# Patient Record
Sex: FEMALE | Race: WHITE | NOT HISPANIC OR LATINO | Employment: FULL TIME | URBAN - METROPOLITAN AREA
[De-identification: names, ages, dates, MRNs, and addresses within clinical notes are randomized per-mention and may not be internally consistent; named-entity substitution may affect disease eponyms.]

---

## 2017-01-26 ENCOUNTER — COMMUNICATION - HEALTHEAST (OUTPATIENT)
Dept: FAMILY MEDICINE | Facility: CLINIC | Age: 37
End: 2017-01-26

## 2017-01-26 DIAGNOSIS — Z00.00 HEALTHY ADULT ON ROUTINE PHYSICAL EXAMINATION: ICD-10-CM

## 2017-01-26 DIAGNOSIS — Z80.3 FAMILY HISTORY OF BREAST CANCER: ICD-10-CM

## 2017-03-07 ENCOUNTER — HOSPITAL ENCOUNTER (OUTPATIENT)
Dept: MAMMOGRAPHY | Facility: HOSPITAL | Age: 37
Discharge: HOME | End: 2017-03-07
Attending: FAMILY MEDICINE

## 2017-03-07 ENCOUNTER — HOSPITAL ENCOUNTER (OUTPATIENT)
Dept: MRI IMAGING | Facility: HOSPITAL | Age: 37
Discharge: HOME | End: 2017-03-07
Attending: FAMILY MEDICINE

## 2017-03-07 DIAGNOSIS — Z12.31 VISIT FOR SCREENING MAMMOGRAM: ICD-10-CM

## 2017-03-07 DIAGNOSIS — Z00.00 HEALTHY ADULT ON ROUTINE PHYSICAL EXAMINATION: ICD-10-CM

## 2017-03-07 DIAGNOSIS — Z80.3 FAMILY HISTORY OF BREAST CANCER: ICD-10-CM

## 2017-03-28 ENCOUNTER — OFFICE VISIT - HEALTHEAST (OUTPATIENT)
Dept: ALLERGY | Facility: CLINIC | Age: 37
End: 2017-03-28

## 2017-03-28 DIAGNOSIS — J30.1 SEASONAL ALLERGIC RHINITIS DUE TO POLLEN: ICD-10-CM

## 2017-04-24 ENCOUNTER — OFFICE VISIT - HEALTHEAST (OUTPATIENT)
Dept: FAMILY MEDICINE | Facility: CLINIC | Age: 37
End: 2017-04-24

## 2017-04-24 DIAGNOSIS — L03.90 CELLULITIS: ICD-10-CM

## 2017-08-16 ENCOUNTER — OFFICE VISIT - HEALTHEAST (OUTPATIENT)
Dept: FAMILY MEDICINE | Facility: CLINIC | Age: 37
End: 2017-08-16

## 2017-08-16 ENCOUNTER — HOSPITAL ENCOUNTER (OUTPATIENT)
Dept: ULTRASOUND IMAGING | Facility: HOSPITAL | Age: 37
Discharge: HOME | End: 2017-08-16
Attending: FAMILY MEDICINE

## 2017-08-16 DIAGNOSIS — R10.11 RUQ ABDOMINAL PAIN: ICD-10-CM

## 2017-08-16 ASSESSMENT — MIFFLIN-ST. JEOR: SCORE: 1577.36

## 2017-08-17 ENCOUNTER — HOSPITAL ENCOUNTER (OUTPATIENT)
Dept: CT IMAGING | Facility: HOSPITAL | Age: 37
Discharge: HOME | End: 2017-08-17
Attending: FAMILY MEDICINE

## 2017-08-17 DIAGNOSIS — R10.11 RUQ ABDOMINAL PAIN: ICD-10-CM

## 2017-08-23 ENCOUNTER — COMMUNICATION - HEALTHEAST (OUTPATIENT)
Dept: FAMILY MEDICINE | Facility: CLINIC | Age: 37
End: 2017-08-23

## 2017-09-17 ENCOUNTER — OFFICE VISIT - HEALTHEAST (OUTPATIENT)
Dept: FAMILY MEDICINE | Facility: CLINIC | Age: 37
End: 2017-09-17

## 2017-09-17 DIAGNOSIS — N39.0 URINARY TRACT INFECTION: ICD-10-CM

## 2017-09-17 DIAGNOSIS — N89.8 VAGINAL IRRITATION: ICD-10-CM

## 2017-09-17 DIAGNOSIS — R30.0 DYSURIA: ICD-10-CM

## 2017-10-09 ENCOUNTER — OFFICE VISIT - HEALTHEAST (OUTPATIENT)
Dept: FAMILY MEDICINE | Facility: CLINIC | Age: 37
End: 2017-10-09

## 2017-10-09 ENCOUNTER — RECORDS - HEALTHEAST (OUTPATIENT)
Dept: GENERAL RADIOLOGY | Facility: CLINIC | Age: 37
End: 2017-10-09

## 2017-10-09 DIAGNOSIS — J45.990 EXERCISE-INDUCED ASTHMA: ICD-10-CM

## 2017-10-09 DIAGNOSIS — J45.990 EXERCISE INDUCED BRONCHOSPASM: ICD-10-CM

## 2017-10-09 DIAGNOSIS — Z00.00 HEALTHY ADULT ON ROUTINE PHYSICAL EXAMINATION: ICD-10-CM

## 2017-10-09 DIAGNOSIS — R05.9 COUGH: ICD-10-CM

## 2017-10-09 DIAGNOSIS — Z90.710 S/P HYSTERECTOMY: ICD-10-CM

## 2017-10-09 ASSESSMENT — MIFFLIN-ST. JEOR: SCORE: 1582.35

## 2017-10-13 ENCOUNTER — AMBULATORY - HEALTHEAST (OUTPATIENT)
Dept: FAMILY MEDICINE | Facility: CLINIC | Age: 37
End: 2017-10-13

## 2017-10-13 ENCOUNTER — COMMUNICATION - HEALTHEAST (OUTPATIENT)
Dept: FAMILY MEDICINE | Facility: CLINIC | Age: 37
End: 2017-10-13

## 2017-10-13 DIAGNOSIS — J02.9 SORE THROAT: ICD-10-CM

## 2017-10-24 ENCOUNTER — OFFICE VISIT - HEALTHEAST (OUTPATIENT)
Dept: FAMILY MEDICINE | Facility: CLINIC | Age: 37
End: 2017-10-24

## 2017-10-24 DIAGNOSIS — J32.9 SINUSITIS: ICD-10-CM

## 2017-10-24 DIAGNOSIS — J02.9 SORE THROAT: ICD-10-CM

## 2017-12-07 ENCOUNTER — OFFICE VISIT - HEALTHEAST (OUTPATIENT)
Dept: FAMILY MEDICINE | Facility: CLINIC | Age: 37
End: 2017-12-07

## 2017-12-07 DIAGNOSIS — J00 COMMON COLD: ICD-10-CM

## 2018-01-03 ENCOUNTER — OFFICE VISIT - HEALTHEAST (OUTPATIENT)
Dept: FAMILY MEDICINE | Facility: CLINIC | Age: 38
End: 2018-01-03

## 2018-01-03 DIAGNOSIS — R30.0 DYSURIA: ICD-10-CM

## 2018-01-03 DIAGNOSIS — N39.0 UTI (URINARY TRACT INFECTION): ICD-10-CM

## 2018-01-03 DIAGNOSIS — S16.1XXS CERVICAL STRAIN, SEQUELA: ICD-10-CM

## 2018-01-03 DIAGNOSIS — J45.909 ASTHMA: ICD-10-CM

## 2018-01-03 DIAGNOSIS — G43.909 MIGRAINE: ICD-10-CM

## 2018-01-03 DIAGNOSIS — E66.3 OVERWEIGHT: ICD-10-CM

## 2018-01-03 DIAGNOSIS — M79.7 FIBROMYALGIA: ICD-10-CM

## 2018-01-03 LAB
ALBUMIN UR-MCNC: ABNORMAL MG/DL
APPEARANCE UR: ABNORMAL
BACTERIA #/AREA URNS HPF: ABNORMAL HPF
BILIRUB UR QL STRIP: NEGATIVE
COLOR UR AUTO: YELLOW
GLUCOSE UR STRIP-MCNC: NEGATIVE MG/DL
HGB UR QL STRIP: ABNORMAL
KETONES UR STRIP-MCNC: NEGATIVE MG/DL
LEUKOCYTE ESTERASE UR QL STRIP: ABNORMAL
MUCOUS THREADS #/AREA URNS LPF: ABNORMAL LPF
NITRATE UR QL: NEGATIVE
PH UR STRIP: 7 [PH] (ref 5–8)
RBC #/AREA URNS AUTO: ABNORMAL HPF
SP GR UR STRIP: 1.02 (ref 1–1.03)
SQUAMOUS #/AREA URNS AUTO: ABNORMAL LPF
UROBILINOGEN UR STRIP-ACNC: ABNORMAL
WBC #/AREA URNS AUTO: ABNORMAL HPF

## 2018-01-03 ASSESSMENT — MIFFLIN-ST. JEOR: SCORE: 1577.81

## 2018-01-05 ENCOUNTER — COMMUNICATION - HEALTHEAST (OUTPATIENT)
Dept: FAMILY MEDICINE | Facility: CLINIC | Age: 38
End: 2018-01-05

## 2018-01-05 LAB — BACTERIA SPEC CULT: ABNORMAL

## 2018-04-02 ENCOUNTER — OFFICE VISIT - HEALTHEAST (OUTPATIENT)
Dept: ALLERGY | Facility: CLINIC | Age: 38
End: 2018-04-02

## 2018-04-02 ENCOUNTER — RECORDS - HEALTHEAST (OUTPATIENT)
Dept: ADMINISTRATIVE | Facility: OTHER | Age: 38
End: 2018-04-02

## 2018-04-02 ENCOUNTER — COMMUNICATION - HEALTHEAST (OUTPATIENT)
Dept: ALLERGY | Facility: CLINIC | Age: 38
End: 2018-04-02

## 2018-04-02 DIAGNOSIS — J45.30 MILD PERSISTENT ASTHMA WITHOUT COMPLICATION: ICD-10-CM

## 2018-04-02 DIAGNOSIS — J38.3 VOCAL CORD DYSFUNCTION: ICD-10-CM

## 2018-04-02 DIAGNOSIS — J30.1 CHRONIC SEASONAL ALLERGIC RHINITIS DUE TO POLLEN: ICD-10-CM

## 2018-08-01 ENCOUNTER — RECORDS - HEALTHEAST (OUTPATIENT)
Dept: ADMINISTRATIVE | Facility: OTHER | Age: 38
End: 2018-08-01

## 2018-09-25 ENCOUNTER — OFFICE VISIT - HEALTHEAST (OUTPATIENT)
Dept: FAMILY MEDICINE | Facility: CLINIC | Age: 38
End: 2018-09-25

## 2018-09-25 DIAGNOSIS — M79.7 FIBROMYALGIA: ICD-10-CM

## 2018-09-25 DIAGNOSIS — G43.909 MIGRAINE: ICD-10-CM

## 2018-09-25 DIAGNOSIS — R92.30 DENSE BREAST TISSUE ON MAMMOGRAM: ICD-10-CM

## 2018-09-25 DIAGNOSIS — Z90.710 S/P HYSTERECTOMY: ICD-10-CM

## 2018-09-25 DIAGNOSIS — I73.00 RAYNAUD'S SYNDROME: ICD-10-CM

## 2018-09-25 DIAGNOSIS — Z00.00 ROUTINE GENERAL MEDICAL EXAMINATION AT A HEALTH CARE FACILITY: ICD-10-CM

## 2018-09-25 DIAGNOSIS — Z80.3 FAMILY HISTORY OF MALIGNANT NEOPLASM OF BREAST: ICD-10-CM

## 2018-09-25 LAB
ALBUMIN SERPL-MCNC: 3.9 G/DL (ref 3.5–5)
ALP SERPL-CCNC: 75 U/L (ref 45–120)
ALT SERPL W P-5'-P-CCNC: 28 U/L (ref 0–45)
ANION GAP SERPL CALCULATED.3IONS-SCNC: 9 MMOL/L (ref 5–18)
AST SERPL W P-5'-P-CCNC: 24 U/L (ref 0–40)
BASOPHILS # BLD AUTO: 0 THOU/UL (ref 0–0.2)
BASOPHILS NFR BLD AUTO: 1 % (ref 0–2)
BILIRUB SERPL-MCNC: 0.5 MG/DL (ref 0–1)
BUN SERPL-MCNC: 15 MG/DL (ref 8–22)
CALCIUM SERPL-MCNC: 9.4 MG/DL (ref 8.5–10.5)
CHLORIDE BLD-SCNC: 107 MMOL/L (ref 98–107)
CHOLEST SERPL-MCNC: 202 MG/DL
CO2 SERPL-SCNC: 24 MMOL/L (ref 22–31)
CREAT SERPL-MCNC: 0.67 MG/DL (ref 0.6–1.1)
EOSINOPHIL # BLD AUTO: 0.1 THOU/UL (ref 0–0.4)
EOSINOPHIL NFR BLD AUTO: 2 % (ref 0–6)
ERYTHROCYTE [DISTWIDTH] IN BLOOD BY AUTOMATED COUNT: 10.6 % (ref 11–14.5)
FASTING STATUS PATIENT QL REPORTED: YES
GFR SERPL CREATININE-BSD FRML MDRD: >60 ML/MIN/1.73M2
GLUCOSE BLD-MCNC: 100 MG/DL (ref 70–125)
HCT VFR BLD AUTO: 41.9 % (ref 35–47)
HDLC SERPL-MCNC: 46 MG/DL
HGB BLD-MCNC: 13.9 G/DL (ref 12–16)
LDLC SERPL CALC-MCNC: 135 MG/DL
LYMPHOCYTES # BLD AUTO: 1.8 THOU/UL (ref 0.8–4.4)
LYMPHOCYTES NFR BLD AUTO: 33 % (ref 20–40)
MCH RBC QN AUTO: 31.7 PG (ref 27–34)
MCHC RBC AUTO-ENTMCNC: 33.2 G/DL (ref 32–36)
MCV RBC AUTO: 95 FL (ref 80–100)
MONOCYTES # BLD AUTO: 0.4 THOU/UL (ref 0–0.9)
MONOCYTES NFR BLD AUTO: 7 % (ref 2–10)
NEUTROPHILS # BLD AUTO: 3.2 THOU/UL (ref 2–7.7)
NEUTROPHILS NFR BLD AUTO: 58 % (ref 50–70)
PLATELET # BLD AUTO: 318 THOU/UL (ref 140–440)
PMV BLD AUTO: 7 FL (ref 7–10)
POTASSIUM BLD-SCNC: 4.6 MMOL/L (ref 3.5–5)
PROT SERPL-MCNC: 6.8 G/DL (ref 6–8)
RBC # BLD AUTO: 4.4 MILL/UL (ref 3.8–5.4)
SODIUM SERPL-SCNC: 140 MMOL/L (ref 136–145)
TRIGL SERPL-MCNC: 107 MG/DL
TSH SERPL DL<=0.005 MIU/L-ACNC: 1.06 UIU/ML (ref 0.3–5)
WBC: 5.5 THOU/UL (ref 4–11)

## 2018-09-25 ASSESSMENT — MIFFLIN-ST. JEOR: SCORE: 1582.63

## 2018-09-26 LAB
25(OH)D3 SERPL-MCNC: 29 NG/ML (ref 30–80)
25(OH)D3 SERPL-MCNC: 29 NG/ML (ref 30–80)

## 2018-11-29 ENCOUNTER — HOSPITAL ENCOUNTER (OUTPATIENT)
Dept: MAMMOGRAPHY | Facility: CLINIC | Age: 38
Discharge: HOME | End: 2018-11-29
Attending: FAMILY MEDICINE

## 2018-11-29 DIAGNOSIS — Z80.3 FAMILY HISTORY OF MALIGNANT NEOPLASM OF BREAST: ICD-10-CM

## 2018-11-29 DIAGNOSIS — R92.30 DENSE BREAST TISSUE ON MAMMOGRAM: ICD-10-CM

## 2018-12-19 ENCOUNTER — RECORDS - HEALTHEAST (OUTPATIENT)
Dept: ADMINISTRATIVE | Facility: OTHER | Age: 38
End: 2018-12-19

## 2019-01-09 ENCOUNTER — OFFICE VISIT - HEALTHEAST (OUTPATIENT)
Dept: FAMILY MEDICINE | Facility: CLINIC | Age: 39
End: 2019-01-09

## 2019-01-09 DIAGNOSIS — Z01.818 PREOP EXAMINATION: ICD-10-CM

## 2019-01-09 DIAGNOSIS — M21.611 BUNION, RIGHT: ICD-10-CM

## 2019-01-09 LAB — HGB BLD-MCNC: 13.9 G/DL (ref 12–16)

## 2019-01-09 ASSESSMENT — MIFFLIN-ST. JEOR: SCORE: 1612.12

## 2019-01-17 ENCOUNTER — RECORDS - HEALTHEAST (OUTPATIENT)
Dept: ADMINISTRATIVE | Facility: OTHER | Age: 39
End: 2019-01-17

## 2019-01-31 ENCOUNTER — RECORDS - HEALTHEAST (OUTPATIENT)
Dept: ADMINISTRATIVE | Facility: OTHER | Age: 39
End: 2019-01-31

## 2019-02-25 ENCOUNTER — RECORDS - HEALTHEAST (OUTPATIENT)
Dept: ADMINISTRATIVE | Facility: OTHER | Age: 39
End: 2019-02-25

## 2019-04-04 ENCOUNTER — OFFICE VISIT - HEALTHEAST (OUTPATIENT)
Dept: ALLERGY | Facility: CLINIC | Age: 39
End: 2019-04-04

## 2019-04-04 DIAGNOSIS — J30.1 SEASONAL ALLERGIC RHINITIS DUE TO POLLEN: ICD-10-CM

## 2019-04-04 DIAGNOSIS — J30.1 CHRONIC SEASONAL ALLERGIC RHINITIS DUE TO POLLEN: ICD-10-CM

## 2019-04-04 DIAGNOSIS — J45.30 MILD PERSISTENT ASTHMA WITHOUT COMPLICATION: ICD-10-CM

## 2019-04-08 ENCOUNTER — RECORDS - HEALTHEAST (OUTPATIENT)
Dept: ADMINISTRATIVE | Facility: OTHER | Age: 39
End: 2019-04-08

## 2019-04-09 ENCOUNTER — COMMUNICATION - HEALTHEAST (OUTPATIENT)
Dept: ALLERGY | Facility: CLINIC | Age: 39
End: 2019-04-09

## 2019-05-02 ENCOUNTER — COMMUNICATION - HEALTHEAST (OUTPATIENT)
Dept: ALLERGY | Facility: CLINIC | Age: 39
End: 2019-05-02

## 2019-05-02 DIAGNOSIS — J30.1 CHRONIC SEASONAL ALLERGIC RHINITIS DUE TO POLLEN: ICD-10-CM

## 2019-05-24 ENCOUNTER — AMBULATORY - HEALTHEAST (OUTPATIENT)
Dept: OTHER | Facility: CLINIC | Age: 39
End: 2019-05-24

## 2019-05-28 ENCOUNTER — COMMUNICATION - HEALTHEAST (OUTPATIENT)
Dept: ALLERGY | Facility: CLINIC | Age: 39
End: 2019-05-28

## 2019-05-28 DIAGNOSIS — J30.1 SEASONAL ALLERGIC RHINITIS DUE TO POLLEN: ICD-10-CM

## 2019-06-10 ENCOUNTER — RECORDS - HEALTHEAST (OUTPATIENT)
Dept: ADMINISTRATIVE | Facility: OTHER | Age: 39
End: 2019-06-10

## 2019-06-12 ENCOUNTER — OFFICE VISIT - HEALTHEAST (OUTPATIENT)
Dept: FAMILY MEDICINE | Facility: CLINIC | Age: 39
End: 2019-06-12

## 2019-06-12 DIAGNOSIS — R07.0 THROAT PAIN: ICD-10-CM

## 2019-06-12 LAB — DEPRECATED S PYO AG THROAT QL EIA: NORMAL

## 2019-06-13 LAB — GROUP A STREP BY PCR: NORMAL

## 2019-06-24 ENCOUNTER — RECORDS - HEALTHEAST (OUTPATIENT)
Dept: ADMINISTRATIVE | Facility: OTHER | Age: 39
End: 2019-06-24

## 2019-07-01 ENCOUNTER — OFFICE VISIT - HEALTHEAST (OUTPATIENT)
Dept: FAMILY MEDICINE | Facility: CLINIC | Age: 39
End: 2019-07-01

## 2019-07-01 DIAGNOSIS — Z01.419 ENCOUNTER FOR GYNECOLOGICAL EXAMINATION WITHOUT ABNORMAL FINDING: ICD-10-CM

## 2019-07-01 DIAGNOSIS — N76.0 BACTERIAL VAGINOSIS: ICD-10-CM

## 2019-07-01 DIAGNOSIS — B96.89 BACTERIAL VAGINOSIS: ICD-10-CM

## 2019-07-01 DIAGNOSIS — Z90.710 S/P HYSTERECTOMY: ICD-10-CM

## 2019-07-01 DIAGNOSIS — N93.9 ABNORMAL VAGINAL BLEEDING: ICD-10-CM

## 2019-07-01 LAB
CLUE CELLS: ABNORMAL
TRICHOMONAS, WET PREP: ABNORMAL
YEAST, WET PREP: ABNORMAL

## 2019-07-01 ASSESSMENT — MIFFLIN-ST. JEOR: SCORE: 1627.26

## 2019-07-02 LAB
C TRACH DNA SPEC QL PROBE+SIG AMP: NEGATIVE
HPV SOURCE: ABNORMAL
HUMAN PAPILLOMA VIRUS 16 DNA: NEGATIVE
HUMAN PAPILLOMA VIRUS 18 DNA: NEGATIVE
HUMAN PAPILLOMA VIRUS FINAL DIAGNOSIS: ABNORMAL
HUMAN PAPILLOMA VIRUS OTHER HR: POSITIVE
N GONORRHOEA DNA SPEC QL NAA+PROBE: NEGATIVE
SPECIMEN DESCRIPTION: ABNORMAL

## 2019-07-05 LAB
BKR LAB AP ABNORMAL BLEEDING: YES
BKR LAB AP BIRTH CONTROL/HORMONES: NORMAL
BKR LAB AP CERVICAL APPEARANCE: NORMAL
BKR LAB AP GYN ADEQUACY: NORMAL
BKR LAB AP GYN INTERPRETATION: NORMAL
BKR LAB AP HPV REFLEX: NORMAL
BKR LAB AP LMP: NORMAL
BKR LAB AP PATIENT STATUS: NORMAL
BKR LAB AP PREVIOUS ABNORMAL: NORMAL
BKR LAB AP PREVIOUS NORMAL: 2015
HIGH RISK?: NO
PATH REPORT.COMMENTS IMP SPEC: NORMAL
RESULT FLAG (HE HISTORICAL CONVERSION): NORMAL

## 2019-07-08 ENCOUNTER — RECORDS - HEALTHEAST (OUTPATIENT)
Dept: ADMINISTRATIVE | Facility: OTHER | Age: 39
End: 2019-07-08

## 2019-10-14 ENCOUNTER — RECORDS - HEALTHEAST (OUTPATIENT)
Dept: ADMINISTRATIVE | Facility: OTHER | Age: 39
End: 2019-10-14

## 2019-10-16 ENCOUNTER — RECORDS - HEALTHEAST (OUTPATIENT)
Dept: ADMINISTRATIVE | Facility: OTHER | Age: 39
End: 2019-10-16

## 2019-11-05 ENCOUNTER — RECORDS - HEALTHEAST (OUTPATIENT)
Dept: ADMINISTRATIVE | Facility: OTHER | Age: 39
End: 2019-11-05

## 2019-11-08 ENCOUNTER — OFFICE VISIT - HEALTHEAST (OUTPATIENT)
Dept: FAMILY MEDICINE | Facility: CLINIC | Age: 39
End: 2019-11-08

## 2019-11-08 DIAGNOSIS — E55.9 VITAMIN D DEFICIENCY: ICD-10-CM

## 2019-11-08 DIAGNOSIS — Z23 NEED FOR INFLUENZA VACCINATION: ICD-10-CM

## 2019-11-08 DIAGNOSIS — Z00.00 ROUTINE GENERAL MEDICAL EXAMINATION AT A HEALTH CARE FACILITY: ICD-10-CM

## 2019-11-08 DIAGNOSIS — J45.20 MILD INTERMITTENT ASTHMA WITHOUT COMPLICATION: ICD-10-CM

## 2019-11-08 LAB
ALBUMIN SERPL-MCNC: 4 G/DL (ref 3.5–5)
ALP SERPL-CCNC: 77 U/L (ref 45–120)
ALT SERPL W P-5'-P-CCNC: 17 U/L (ref 0–45)
ANION GAP SERPL CALCULATED.3IONS-SCNC: 8 MMOL/L (ref 5–18)
AST SERPL W P-5'-P-CCNC: 18 U/L (ref 0–40)
BASOPHILS # BLD AUTO: 0 THOU/UL (ref 0–0.2)
BASOPHILS NFR BLD AUTO: 0 % (ref 0–2)
BILIRUB SERPL-MCNC: 0.6 MG/DL (ref 0–1)
BUN SERPL-MCNC: 13 MG/DL (ref 8–22)
CALCIUM SERPL-MCNC: 9.1 MG/DL (ref 8.5–10.5)
CHLORIDE BLD-SCNC: 104 MMOL/L (ref 98–107)
CHOLEST SERPL-MCNC: 203 MG/DL
CO2 SERPL-SCNC: 26 MMOL/L (ref 22–31)
CREAT SERPL-MCNC: 0.7 MG/DL (ref 0.6–1.1)
EOSINOPHIL # BLD AUTO: 0.1 THOU/UL (ref 0–0.4)
EOSINOPHIL NFR BLD AUTO: 1 % (ref 0–6)
ERYTHROCYTE [DISTWIDTH] IN BLOOD BY AUTOMATED COUNT: 10.5 % (ref 11–14.5)
FASTING STATUS PATIENT QL REPORTED: YES
GFR SERPL CREATININE-BSD FRML MDRD: >60 ML/MIN/1.73M2
GLUCOSE BLD-MCNC: 98 MG/DL (ref 70–125)
HCT VFR BLD AUTO: 40 % (ref 35–47)
HDLC SERPL-MCNC: 45 MG/DL
HGB BLD-MCNC: 14 G/DL (ref 12–16)
LDLC SERPL CALC-MCNC: 142 MG/DL
LYMPHOCYTES # BLD AUTO: 2 THOU/UL (ref 0.8–4.4)
LYMPHOCYTES NFR BLD AUTO: 28 % (ref 20–40)
MCH RBC QN AUTO: 33 PG (ref 27–34)
MCHC RBC AUTO-ENTMCNC: 35 G/DL (ref 32–36)
MCV RBC AUTO: 94 FL (ref 80–100)
MONOCYTES # BLD AUTO: 0.5 THOU/UL (ref 0–0.9)
MONOCYTES NFR BLD AUTO: 7 % (ref 2–10)
NEUTROPHILS # BLD AUTO: 4.6 THOU/UL (ref 2–7.7)
NEUTROPHILS NFR BLD AUTO: 64 % (ref 50–70)
PLATELET # BLD AUTO: 352 THOU/UL (ref 140–440)
PMV BLD AUTO: 7.4 FL (ref 7–10)
POTASSIUM BLD-SCNC: 4.3 MMOL/L (ref 3.5–5)
PROT SERPL-MCNC: 7 G/DL (ref 6–8)
RBC # BLD AUTO: 4.24 MILL/UL (ref 3.8–5.4)
SODIUM SERPL-SCNC: 138 MMOL/L (ref 136–145)
TRIGL SERPL-MCNC: 78 MG/DL
TSH SERPL DL<=0.005 MIU/L-ACNC: 0.91 UIU/ML (ref 0.3–5)
WBC: 7.2 THOU/UL (ref 4–11)

## 2019-11-08 ASSESSMENT — MIFFLIN-ST. JEOR: SCORE: 1587.45

## 2019-11-11 ENCOUNTER — COMMUNICATION - HEALTHEAST (OUTPATIENT)
Dept: FAMILY MEDICINE | Facility: CLINIC | Age: 39
End: 2019-11-11

## 2019-11-11 ENCOUNTER — AMBULATORY - HEALTHEAST (OUTPATIENT)
Dept: FAMILY MEDICINE | Facility: CLINIC | Age: 39
End: 2019-11-11

## 2019-11-11 DIAGNOSIS — M79.7 FIBROMYALGIA: ICD-10-CM

## 2019-11-11 DIAGNOSIS — G43.909 MIGRAINE: ICD-10-CM

## 2019-11-11 LAB
25(OH)D3 SERPL-MCNC: 36.2 NG/ML (ref 30–80)
25(OH)D3 SERPL-MCNC: 36.2 NG/ML (ref 30–80)

## 2019-12-09 ENCOUNTER — OFFICE VISIT - HEALTHEAST (OUTPATIENT)
Dept: FAMILY MEDICINE | Facility: CLINIC | Age: 39
End: 2019-12-09

## 2019-12-09 ENCOUNTER — HOSPITAL ENCOUNTER (OUTPATIENT)
Dept: MAMMOGRAPHY | Facility: CLINIC | Age: 39
Discharge: HOME | End: 2019-12-09
Attending: FAMILY MEDICINE

## 2019-12-09 DIAGNOSIS — Z01.818 PREOP EXAMINATION: ICD-10-CM

## 2019-12-09 DIAGNOSIS — Z98.890 S/P BUNIONECTOMY: ICD-10-CM

## 2019-12-09 DIAGNOSIS — Z12.31 VISIT FOR SCREENING MAMMOGRAM: ICD-10-CM

## 2019-12-09 LAB — HGB BLD-MCNC: 13.2 G/DL (ref 12–16)

## 2019-12-09 ASSESSMENT — MIFFLIN-ST. JEOR: SCORE: 1623.74

## 2019-12-10 ENCOUNTER — RECORDS - HEALTHEAST (OUTPATIENT)
Dept: ADMINISTRATIVE | Facility: OTHER | Age: 39
End: 2019-12-10

## 2019-12-13 ENCOUNTER — RECORDS - HEALTHEAST (OUTPATIENT)
Dept: ADMINISTRATIVE | Facility: OTHER | Age: 39
End: 2019-12-13

## 2019-12-19 ENCOUNTER — RECORDS - HEALTHEAST (OUTPATIENT)
Dept: ADMINISTRATIVE | Facility: OTHER | Age: 39
End: 2019-12-19

## 2020-01-02 ENCOUNTER — RECORDS - HEALTHEAST (OUTPATIENT)
Dept: ADMINISTRATIVE | Facility: OTHER | Age: 40
End: 2020-01-02

## 2020-01-23 ENCOUNTER — RECORDS - HEALTHEAST (OUTPATIENT)
Dept: ADMINISTRATIVE | Facility: OTHER | Age: 40
End: 2020-01-23

## 2020-02-05 ENCOUNTER — RECORDS - HEALTHEAST (OUTPATIENT)
Dept: ADMINISTRATIVE | Facility: OTHER | Age: 40
End: 2020-02-05

## 2020-02-19 ENCOUNTER — RECORDS - HEALTHEAST (OUTPATIENT)
Dept: ADMINISTRATIVE | Facility: OTHER | Age: 40
End: 2020-02-19

## 2020-03-06 ENCOUNTER — OFFICE VISIT - HEALTHEAST (OUTPATIENT)
Dept: FAMILY MEDICINE | Facility: CLINIC | Age: 40
End: 2020-03-06

## 2020-03-06 DIAGNOSIS — N95.1 MENOPAUSAL FLUSHING: ICD-10-CM

## 2020-03-06 DIAGNOSIS — R23.2 FACIAL FLUSHING: ICD-10-CM

## 2020-03-06 LAB
ALBUMIN SERPL-MCNC: 4.1 G/DL (ref 3.5–5)
ALBUMIN UR-MCNC: NEGATIVE MG/DL
ALP SERPL-CCNC: 87 U/L (ref 45–120)
ALT SERPL W P-5'-P-CCNC: 24 U/L (ref 0–45)
APPEARANCE UR: CLEAR
AST SERPL W P-5'-P-CCNC: 20 U/L (ref 0–40)
BACTERIA #/AREA URNS HPF: ABNORMAL HPF
BASOPHILS # BLD AUTO: 0.1 THOU/UL (ref 0–0.2)
BASOPHILS NFR BLD AUTO: 1 % (ref 0–2)
BILIRUB DIRECT SERPL-MCNC: <0.1 MG/DL
BILIRUB SERPL-MCNC: 0.3 MG/DL (ref 0–1)
BILIRUB UR QL STRIP: NEGATIVE
C REACTIVE PROTEIN LHE: 0.4 MG/DL (ref 0–0.8)
COLOR UR AUTO: YELLOW
EOSINOPHIL # BLD AUTO: 0.1 THOU/UL (ref 0–0.4)
EOSINOPHIL NFR BLD AUTO: 2 % (ref 0–6)
ERYTHROCYTE [DISTWIDTH] IN BLOOD BY AUTOMATED COUNT: 11.1 % (ref 11–14.5)
ERYTHROCYTE [SEDIMENTATION RATE] IN BLOOD BY WESTERGREN METHOD: 14 MM/HR (ref 0–20)
FSH SERPL-ACNC: 3.1 MIU/ML
GLUCOSE UR STRIP-MCNC: NEGATIVE MG/DL
HCT VFR BLD AUTO: 40 % (ref 35–47)
HGB BLD-MCNC: 13.4 G/DL (ref 12–16)
HGB UR QL STRIP: ABNORMAL
KETONES UR STRIP-MCNC: NEGATIVE MG/DL
LEUKOCYTE ESTERASE UR QL STRIP: NEGATIVE
LYMPHOCYTES # BLD AUTO: 2.8 THOU/UL (ref 0.8–4.4)
LYMPHOCYTES NFR BLD AUTO: 35 % (ref 20–40)
MCH RBC QN AUTO: 31.7 PG (ref 27–34)
MCHC RBC AUTO-ENTMCNC: 33.6 G/DL (ref 32–36)
MCV RBC AUTO: 95 FL (ref 80–100)
MONOCYTES # BLD AUTO: 0.6 THOU/UL (ref 0–0.9)
MONOCYTES NFR BLD AUTO: 7 % (ref 2–10)
NEUTROPHILS # BLD AUTO: 4.3 THOU/UL (ref 2–7.7)
NEUTROPHILS NFR BLD AUTO: 55 % (ref 50–70)
NITRATE UR QL: NEGATIVE
PH UR STRIP: 5.5 [PH] (ref 5–8)
PLATELET # BLD AUTO: 369 THOU/UL (ref 140–440)
PMV BLD AUTO: 6.9 FL (ref 7–10)
PROT SERPL-MCNC: 7.1 G/DL (ref 6–8)
RBC # BLD AUTO: 4.23 MILL/UL (ref 3.8–5.4)
RBC #/AREA URNS AUTO: ABNORMAL HPF
RHEUMATOID FACT SERPL-ACNC: <15 IU/ML (ref 0–30)
SP GR UR STRIP: 1.01 (ref 1–1.03)
SQUAMOUS #/AREA URNS AUTO: ABNORMAL LPF
UROBILINOGEN UR STRIP-ACNC: ABNORMAL
WBC #/AREA URNS AUTO: ABNORMAL HPF
WBC: 7.8 THOU/UL (ref 4–11)

## 2020-03-09 LAB — ANA SER QL: 3.7 U

## 2020-03-10 LAB — CCP AB SER IA-ACNC: <0.5 U/ML

## 2020-03-12 ENCOUNTER — COMMUNICATION - HEALTHEAST (OUTPATIENT)
Dept: FAMILY MEDICINE | Facility: CLINIC | Age: 40
End: 2020-03-12

## 2020-03-13 ENCOUNTER — AMBULATORY - HEALTHEAST (OUTPATIENT)
Dept: FAMILY MEDICINE | Facility: CLINIC | Age: 40
End: 2020-03-13

## 2020-03-13 DIAGNOSIS — M79.10 MYALGIA: ICD-10-CM

## 2020-03-13 DIAGNOSIS — R76.8 ELEVATED ANTINUCLEAR ANTIBODY (ANA) LEVEL: ICD-10-CM

## 2020-04-10 ENCOUNTER — COMMUNICATION - HEALTHEAST (OUTPATIENT)
Dept: ALLERGY | Facility: CLINIC | Age: 40
End: 2020-04-10

## 2020-04-10 DIAGNOSIS — J30.1 CHRONIC SEASONAL ALLERGIC RHINITIS DUE TO POLLEN: ICD-10-CM

## 2020-04-13 ENCOUNTER — OFFICE VISIT - HEALTHEAST (OUTPATIENT)
Dept: ALLERGY | Facility: CLINIC | Age: 40
End: 2020-04-13

## 2020-04-13 DIAGNOSIS — J30.1 SEASONAL ALLERGIC RHINITIS DUE TO POLLEN: ICD-10-CM

## 2020-04-13 DIAGNOSIS — J30.1 CHRONIC SEASONAL ALLERGIC RHINITIS DUE TO POLLEN: ICD-10-CM

## 2020-04-13 DIAGNOSIS — J45.30 MILD PERSISTENT ASTHMA WITHOUT COMPLICATION: ICD-10-CM

## 2020-04-13 ASSESSMENT — MIFFLIN-ST. JEOR: SCORE: 1615.23

## 2020-05-29 ENCOUNTER — RECORDS - HEALTHEAST (OUTPATIENT)
Dept: ADMINISTRATIVE | Facility: OTHER | Age: 40
End: 2020-05-29

## 2020-05-29 ENCOUNTER — COMMUNICATION - HEALTHEAST (OUTPATIENT)
Dept: FAMILY MEDICINE | Facility: CLINIC | Age: 40
End: 2020-05-29

## 2020-06-16 ENCOUNTER — COMMUNICATION - HEALTHEAST (OUTPATIENT)
Dept: ALLERGY | Facility: CLINIC | Age: 40
End: 2020-06-16

## 2020-06-16 DIAGNOSIS — J45.30 MILD PERSISTENT ASTHMA WITHOUT COMPLICATION: ICD-10-CM

## 2020-06-25 ENCOUNTER — OFFICE VISIT - HEALTHEAST (OUTPATIENT)
Dept: FAMILY MEDICINE | Facility: CLINIC | Age: 40
End: 2020-06-25

## 2020-06-25 DIAGNOSIS — B97.7 HIGH RISK HPV INFECTION: ICD-10-CM

## 2020-06-26 LAB
HPV SOURCE: NORMAL
HUMAN PAPILLOMA VIRUS 16 DNA: NEGATIVE
HUMAN PAPILLOMA VIRUS 18 DNA: NEGATIVE
HUMAN PAPILLOMA VIRUS FINAL DIAGNOSIS: NORMAL
HUMAN PAPILLOMA VIRUS OTHER HR: NEGATIVE
SPECIMEN DESCRIPTION: NORMAL

## 2020-07-02 LAB
BKR LAB AP ABNORMAL BLEEDING: NO
BKR LAB AP BIRTH CONTROL/HORMONES: NORMAL
BKR LAB AP CERVICAL APPEARANCE: NORMAL
BKR LAB AP GYN ADEQUACY: NORMAL
BKR LAB AP GYN INTERPRETATION: NORMAL
BKR LAB AP HPV REFLEX: NORMAL
BKR LAB AP LMP: NORMAL
BKR LAB AP PATIENT STATUS: NORMAL
BKR LAB AP PREVIOUS ABNORMAL: NORMAL
BKR LAB AP PREVIOUS NORMAL: 2015
HIGH RISK?: NO
PATH REPORT.COMMENTS IMP SPEC: NORMAL
RESULT FLAG (HE HISTORICAL CONVERSION): NORMAL

## 2020-12-10 ENCOUNTER — HOSPITAL ENCOUNTER (OUTPATIENT)
Dept: MAMMOGRAPHY | Facility: CLINIC | Age: 40
Discharge: HOME | End: 2020-12-10
Attending: FAMILY MEDICINE

## 2020-12-10 DIAGNOSIS — Z12.31 VISIT FOR SCREENING MAMMOGRAM: ICD-10-CM

## 2021-01-21 ENCOUNTER — RECORDS - HEALTHEAST (OUTPATIENT)
Dept: ADMINISTRATIVE | Facility: OTHER | Age: 41
End: 2021-01-21

## 2021-02-22 ENCOUNTER — AMBULATORY - HEALTHEAST (OUTPATIENT)
Dept: FAMILY MEDICINE | Facility: CLINIC | Age: 41
End: 2021-02-22

## 2021-02-22 ENCOUNTER — OFFICE VISIT - HEALTHEAST (OUTPATIENT)
Dept: FAMILY MEDICINE | Facility: CLINIC | Age: 41
End: 2021-02-22

## 2021-02-22 DIAGNOSIS — J02.9 SORE THROAT: ICD-10-CM

## 2021-02-22 DIAGNOSIS — Z20.822 SUSPECTED COVID-19 VIRUS INFECTION: ICD-10-CM

## 2021-02-22 DIAGNOSIS — R21 RASH OF FACE: ICD-10-CM

## 2021-02-22 LAB
DEPRECATED S PYO AG THROAT QL EIA: NORMAL
GROUP A STREP BY PCR: NORMAL
SARS-COV-2 PCR COMMENT: NORMAL
SARS-COV-2 RNA SPEC QL NAA+PROBE: NEGATIVE
SARS-COV-2 VIRUS SPECIMEN SOURCE: NORMAL

## 2021-02-23 ENCOUNTER — COMMUNICATION - HEALTHEAST (OUTPATIENT)
Dept: SCHEDULING | Facility: CLINIC | Age: 41
End: 2021-02-23

## 2021-03-16 ENCOUNTER — RECORDS - HEALTHEAST (OUTPATIENT)
Dept: ADMINISTRATIVE | Facility: OTHER | Age: 41
End: 2021-03-16

## 2021-04-02 ENCOUNTER — OFFICE VISIT - HEALTHEAST (OUTPATIENT)
Dept: ALLERGY | Facility: CLINIC | Age: 41
End: 2021-04-02

## 2021-04-02 DIAGNOSIS — J30.1 CHRONIC SEASONAL ALLERGIC RHINITIS DUE TO POLLEN: ICD-10-CM

## 2021-04-02 DIAGNOSIS — J30.1 SEASONAL ALLERGIC RHINITIS DUE TO POLLEN: ICD-10-CM

## 2021-04-02 DIAGNOSIS — J45.20 MILD INTERMITTENT ASTHMA WITHOUT COMPLICATION: ICD-10-CM

## 2021-04-02 DIAGNOSIS — J45.30 MILD PERSISTENT ASTHMA WITHOUT COMPLICATION: ICD-10-CM

## 2021-04-02 RX ORDER — CETIRIZINE HYDROCHLORIDE 10 MG/1
10 TABLET ORAL DAILY
Qty: 90 TABLET | Refills: 3 | Status: SHIPPED | OUTPATIENT
Start: 2021-04-02 | End: 2022-05-05

## 2021-04-02 RX ORDER — LORATADINE 10 MG/1
10 TABLET ORAL DAILY
Qty: 90 TABLET | Refills: 3 | Status: SHIPPED | OUTPATIENT
Start: 2021-04-02 | End: 2022-07-18

## 2021-04-02 RX ORDER — MONTELUKAST SODIUM 10 MG/1
TABLET ORAL
Qty: 90 TABLET | Refills: 3 | Status: SHIPPED | OUTPATIENT
Start: 2021-04-02 | End: 2022-03-14

## 2021-04-02 RX ORDER — LEVALBUTEROL TARTRATE 45 UG/1
1-2 AEROSOL, METERED ORAL EVERY 4 HOURS PRN
Qty: 1 INHALER | Refills: 0 | Status: SHIPPED | OUTPATIENT
Start: 2021-04-02 | End: 2022-05-05

## 2021-04-05 ENCOUNTER — COMMUNICATION - HEALTHEAST (OUTPATIENT)
Dept: PHARMACY | Facility: HOSPITAL | Age: 41
End: 2021-04-05

## 2021-04-05 ENCOUNTER — RECORDS - HEALTHEAST (OUTPATIENT)
Dept: ADMINISTRATIVE | Facility: OTHER | Age: 41
End: 2021-04-05

## 2021-05-25 ENCOUNTER — RECORDS - HEALTHEAST (OUTPATIENT)
Dept: ADMINISTRATIVE | Facility: CLINIC | Age: 41
End: 2021-05-25

## 2021-05-27 ENCOUNTER — RECORDS - HEALTHEAST (OUTPATIENT)
Dept: ADMINISTRATIVE | Facility: CLINIC | Age: 41
End: 2021-05-27

## 2021-05-27 NOTE — PATIENT INSTRUCTIONS - HE
2 puffs every 6 hours for Dulera when sick otherwise 2 puffs twice daily     Montelukast    Xopenex (rescue) prior to exercise    Astelin

## 2021-05-27 NOTE — PROGRESS NOTES
Chief complaint:  Allergy follow-up    History of Present Illness:  This is a pleasant 38 year old woman here for follow-up of allergies and asthma.  She has a history of intermittent asthma and uses Dulera 100/5 2 puffs twice daily in the yellow zone on her asthma action plan.  She states with doing this she feels relatively well.  ACT score today is 24.  No cough, wheeze or shortness of breath.  Rare need for her albuterol inhaler.  She reports she had only one sinus infection last year.  This is improved from her 5-6 she normally has.  She continues alternating Zyrtec with Claritin she also uses Astelin nasal spray as needed and uses montelukast daily.  No emergency room visits or urgent care visits for asthma.  No hospitalizations for asthma.    Past medical history, social history, family medical history, meds and allergies reviewed and updated accordingly.      Review of Systems performed as above and the remainder is negative.         Current Outpatient Medications:      azelastine (ASTELIN) 137 mcg (0.1 %) nasal spray, 2 sprays into each nostril 2 (two) times a day. Use in each nostril as directed, Disp: 30 mL, Rfl: 9     cetirizine (ZYRTEC) 10 MG tablet, Take 1 tablet (10 mg total) by mouth daily., Disp: 90 tablet, Rfl: 3     CHOLECALCIFEROL, VITAMIN D3, (VITAMIN D3 ORAL), Take 1 capsule by mouth daily. 5000, Disp: , Rfl:      ibuprofen (ADVIL,MOTRIN) 800 MG tablet, Take 800 mg by mouth every 6 (six) hours as needed for pain., Disp: , Rfl:      mometasone-formoterol (DULERA) 100-5 mcg/actuation HFAA inhaler, Inhale 2 puffs 2 (two) times a day., Disp: 1 Inhaler, Rfl: 1     montelukast (SINGULAIR) 10 mg tablet, TAKE ONE TABLET BY MOUTH ONE TIME DAILY, Disp: 90 tablet, Rfl: 3     tiZANidine (ZANAFLEX) 4 MG tablet, Take 1 tablet (4 mg total) by mouth at bedtime as needed., Disp: 30 tablet, Rfl: 3     levalbuterol (XOPENEX HFA) 45 mcg/actuation inhaler, Inhale 1-2 puffs every 4 (four) hours as needed for  wheezing., Disp: 1 Inhaler, Rfl: 12     loratadine (CLARITIN) 10 mg tablet, Take 1 tablet (10 mg total) by mouth daily., Disp: 90 tablet, Rfl: 3    Allergies   Allergen Reactions     Naproxen Other (See Comments)     Hand swelling     Penicillins Hives     Prednisone      Other: Redness and extreme irritability         /60   Pulse 87   SpO2 97%   Gen: Pleasant female not in acute distress  HEENT: Eyes no erythema of the bulbar or palpebral conjunctiva, no edema. Ears: TMs well visualized, no effusions. Nose: No congestion, mucosa normal. Mouth: Throat clear, no lip or tongue edema.   Cardiac: Regular rate and rhythm, no murmurs, rubs or gallops  Respiratory: Clear to auscultation bilaterally, no adventitious breath sounds    Skin: No rashes or lesions  Psych: Alert and oriented times 3      Impression report and plan:    1.  Allergic rhinitis  2.  Intermittent asthma    Okay to use Dulera as she is doing.  Otherwise, she can increase Dulera to 2 puffs every 6 hours in the yellow zone on her asthma action plan.  Continue Astelin as needed.  Continue montelukast.  Patient is doing quite well with her symptoms.  If symptoms not controlled, I would like to hear from her.  Otherwise follow as needed or yearly.

## 2021-05-28 ENCOUNTER — RECORDS - HEALTHEAST (OUTPATIENT)
Dept: ADMINISTRATIVE | Facility: CLINIC | Age: 41
End: 2021-05-28

## 2021-05-28 ASSESSMENT — ASTHMA QUESTIONNAIRES
ACT_TOTALSCORE: 23
ACT_TOTALSCORE: 25
ACT_TOTALSCORE: 25

## 2021-05-29 ENCOUNTER — RECORDS - HEALTHEAST (OUTPATIENT)
Dept: ADMINISTRATIVE | Facility: CLINIC | Age: 41
End: 2021-05-29

## 2021-05-29 NOTE — PATIENT INSTRUCTIONS - HE
Advised fluids, salt water gargles, Tylenol as needed for pain.      We will call you tomorrow for final strep throat test only if it's positive.

## 2021-05-29 NOTE — PROGRESS NOTES
Chief Complaint   Patient presents with     Sore Throat         HPI      Patient is here for 2 days of moderate sore throat, with subjective fever. No cough, nasal congestion.     ROS: Pertinent ROS noted in HPI.     Allergies   Allergen Reactions     Naproxen Other (See Comments)     Hand swelling     Penicillins Hives     Prednisone      Other: Redness and extreme irritability         Patient Active Problem List   Diagnosis     Overweight     Migraine Headache     Vitamin D Deficiency     Generalized Osteoarthritis Of Multiple Sites     Joint Pain, Localized In The Wrist     Fibromyalgia     Carpal Tunnel Syndrome     Allergic Rhinitis     Lump In / On The Skin     Tingling (Paresthesia)     Abdominal Pain     Asthma     Allergic Rhinitis Due To Ragweed Pollen     Allergic Rhinitis Due To Cats     Allergic Rhinitis Due To Mold     Cervical strain     S/P hysterectomy     Dense breast tissue on mammogram     Family history of malignant neoplasm of breast     Raynaud's syndrome     Bunion, right       Family History   Problem Relation Age of Onset     Breast cancer Paternal Aunt 30     Heart disease Paternal Aunt      Breast cancer Paternal Grandmother      Heart disease Paternal Grandmother      Diabetes Paternal Grandmother      Stroke Paternal Grandmother      Cervical cancer Mother      Hashimoto's thyroiditis Mother      Heart attack Father 40     Heart attack Paternal Grandfather      Diabetes Maternal Grandmother      Stroke Maternal Grandmother      Bipolar disorder Daughter      ADD / ADHD Daughter      Uterine cancer Paternal Aunt        Social History     Socioeconomic History     Marital status:      Spouse name: Not on file     Number of children: Not on file     Years of education: Not on file     Highest education level: Not on file   Occupational History     Not on file   Social Needs     Financial resource strain: Not on file     Food insecurity:     Worry: Not on file     Inability: Not on  Pt notified rx faxed Earnest please sign order    file     Transportation needs:     Medical: Not on file     Non-medical: Not on file   Tobacco Use     Smoking status: Never Smoker     Smokeless tobacco: Never Used   Substance and Sexual Activity     Alcohol use: Yes     Comment: rarely     Drug use: No     Sexual activity: Yes     Partners: Male     Birth control/protection: None     Comment: hysterectomy   Lifestyle     Physical activity:     Days per week: Not on file     Minutes per session: Not on file     Stress: Not on file   Relationships     Social connections:     Talks on phone: Not on file     Gets together: Not on file     Attends [employer] service: Not on file     Active member of club or organization: Not on file     Attends meetings of clubs or organizations: Not on file     Relationship status: Not on file     Intimate partner violence:     Fear of current or ex partner: Not on file     Emotionally abused: Not on file     Physically abused: Not on file     Forced sexual activity: Not on file   Other Topics Concern     Not on file   Social History Narrative     Not on file         Objective:    Vitals:    06/12/19 1801   BP: 116/68   Pulse: 83   Resp: 18   Temp: 98.3  F (36.8  C)   SpO2: 100%       Gen:NAD  Throat: mild erythema of posterior pharynx without edema nor exudates  Ears: TMs clear without effusion, ear canals normal with small cerumen  Nose: no discharge  Neck: No significant adenopathy   CV: RRR, normal S1S2, no M, R, G  Pulm: CTAB, normal effort    Recent Results (from the past 24 hour(s))   Rapid Strep A Screen-Throat swab   Result Value Ref Range    Rapid Strep A Antigen No Group A Strep detected, presumptive negative No Group A Strep detected, presumptive negative       Throat pain  -     Rapid Strep A Screen-Throat swab  -     Group A Strep, RNA Direct Detection, Throat    Negative RST, pending final test. Suspect viral.  Patient declined Mono test.     Patient requested voicemail for final strep test, and gave permission to leave  detailed result.

## 2021-05-30 ENCOUNTER — RECORDS - HEALTHEAST (OUTPATIENT)
Dept: ADMINISTRATIVE | Facility: CLINIC | Age: 41
End: 2021-05-30

## 2021-05-30 VITALS — WEIGHT: 195 LBS | BODY MASS INDEX: 33.47 KG/M2

## 2021-05-30 NOTE — PROGRESS NOTES
ASSESSMENT/PLAN:  1. Abnormal vaginal bleeding  Chlamydia trachomatis & Neisseria gonorrhoeae, Amplified Detection    Wet Prep, Vaginal   2. S/P hysterectomy     3. Encounter for gynecological examination without abnormal finding  Gynecologic Cytology (PAP Smear)    HPV High Risk DNA Cervical   4. Bacterial vaginosis  clindamycin (CLEOCIN) 2 % vaginal cream       This is a 40 yo female with:  1.  S/p hysterectomy (years ago), now with reported vaginal bleeding.  This occurred after intercourse - no evidence of blood or bloody discharge or any lesions on exam.  Check for infection - treat accordingly  2.  Due for pap smear - discussed recommendations - will check pap/HPV.  3.  Bacterial vaginosis - seen on wet prep today - will treat with Clindamycin intravaginally.  Return in about 3 months (around 10/1/2019) for Recheck.      There are no discontinued medications.  There are no Patient Instructions on file for this visit.    Chief Complaint:  Chief Complaint   Patient presents with     Vaginal Bleeding       HPI:   Alanna Pederson is a 39 y.o. female c/o  Had hysterectomy due to advanced cervical dysplasia  Mom had cervical cancer  Maternal aunt had uterine cancer  So - patient had hysterectomy - 15 years ago (mid 20s)    Bleeding after intercourse -   One time - even saw it on her sheets  No change in laundry soap/soaps - no bubble bath        PMH:   Patient Active Problem List    Diagnosis Date Noted     Bunion, right 01/09/2019     Dense breast tissue on mammogram 09/25/2018     Family history of malignant neoplasm of breast 09/25/2018     Raynaud's syndrome 09/25/2018     S/P hysterectomy 10/09/2017     Cervical strain 12/17/2014     Abdominal Pain      Asthma      Allergic Rhinitis Due To Ragweed Pollen      Allergic Rhinitis Due To Cats      Allergic Rhinitis Due To Mold      Overweight      Migraine Headache      Vitamin D Deficiency      Generalized Osteoarthritis Of Multiple Sites      Joint Pain, Localized  In The Wrist      Fibromyalgia      Carpal Tunnel Syndrome      Allergic Rhinitis      Lump In / On The Skin      Tingling (Paresthesia)      Past Medical History:   Diagnosis Date     Abnormal Pap smear of cervix     hysterectomy, needs vaginal pap every 5 yrs     Acne Vulgaris     Created by Conversion      Concussion 12/21/2014     Ovarian cyst     Right     Past Surgical History:   Procedure Laterality Date     CYSTECTOMY Right     several     HYSTERECTOMY  2005     LAPAROSCOPIC VAGINAL HYSTERECTOMY  3/2005    for cervical cancer, needs vaginal pap every 5 yrs     TONSILLECTOMY  1992     TUBAL LIGATION  2002     Social History     Socioeconomic History     Marital status:      Spouse name: Not on file     Number of children: Not on file     Years of education: Not on file     Highest education level: Not on file   Occupational History     Not on file   Social Needs     Financial resource strain: Not on file     Food insecurity:     Worry: Not on file     Inability: Not on file     Transportation needs:     Medical: Not on file     Non-medical: Not on file   Tobacco Use     Smoking status: Never Smoker     Smokeless tobacco: Never Used   Substance and Sexual Activity     Alcohol use: Yes     Comment: rarely     Drug use: No     Sexual activity: Yes     Partners: Male     Birth control/protection: None     Comment: hysterectomy   Lifestyle     Physical activity:     Days per week: Not on file     Minutes per session: Not on file     Stress: Not on file   Relationships     Social connections:     Talks on phone: Not on file     Gets together: Not on file     Attends Yarsanism service: Not on file     Active member of club or organization: Not on file     Attends meetings of clubs or organizations: Not on file     Relationship status: Not on file     Intimate partner violence:     Fear of current or ex partner: Not on file     Emotionally abused: Not on file     Physically abused: Not on file     Forced  sexual activity: Not on file   Other Topics Concern     Not on file   Social History Narrative     Not on file     Family History   Problem Relation Age of Onset     Breast cancer Paternal Aunt 30     Heart disease Paternal Aunt      Breast cancer Paternal Grandmother      Heart disease Paternal Grandmother      Diabetes Paternal Grandmother      Stroke Paternal Grandmother      Cervical cancer Mother      Hashimoto's thyroiditis Mother      Heart attack Father 40     Heart attack Paternal Grandfather      Diabetes Maternal Grandmother      Stroke Maternal Grandmother      Bipolar disorder Daughter      ADD / ADHD Daughter      Uterine cancer Paternal Aunt        Meds:    Current Outpatient Medications:      azelastine (ASTELIN) 137 mcg (0.1 %) nasal spray, 2 sprays into each nostril 2 (two) times a day. Use in each nostril as directed, Disp: 30 mL, Rfl: 9     cetirizine (ZYRTEC) 10 MG tablet, Take 1 tablet (10 mg total) by mouth daily., Disp: 90 tablet, Rfl: 3     CHOLECALCIFEROL, VITAMIN D3, (VITAMIN D3 ORAL), Take 1 capsule by mouth daily. 5000, Disp: , Rfl:      ibuprofen (ADVIL,MOTRIN) 800 MG tablet, Take 800 mg by mouth every 6 (six) hours as needed for pain., Disp: , Rfl:      levalbuterol (XOPENEX HFA) 45 mcg/actuation inhaler, Inhale 1-2 puffs every 4 (four) hours as needed for wheezing., Disp: 1 Inhaler, Rfl: 12     loratadine (CLARITIN) 10 mg tablet, Take 1 tablet (10 mg total) by mouth daily., Disp: 90 tablet, Rfl: 3     mometasone-formoterol (DULERA) 100-5 mcg/actuation HFAA inhaler, Inhale 2 puffs 2 (two) times a day., Disp: 1 Inhaler, Rfl: 1     montelukast (SINGULAIR) 10 mg tablet, TAKE ONE TABLET BY MOUTH ONE TIME DAILY, Disp: 90 tablet, Rfl: 3     tiZANidine (ZANAFLEX) 4 MG tablet, Take 1 tablet (4 mg total) by mouth at bedtime as needed., Disp: 30 tablet, Rfl: 3     clindamycin (CLEOCIN) 2 % vaginal cream, One full applicator (approximately 100 mg clindamycin phosphate) intravaginally at bedtime  "for 3 day, Disp: 40 g, Rfl: 0    Allergies:  Allergies   Allergen Reactions     Naproxen Other (See Comments)     Hand swelling     Penicillins Hives     Prednisone      Other: Redness and extreme irritability         ROS:  Pertinent positives as noted in HPI; otherwise 12 point ROS negative.      Physical Exam:  EXAM:  /80 (Patient Site: Left Arm, Patient Position: Sitting, Cuff Size: Adult Regular)   Pulse 91   Temp 98.9  F (37.2  C) (Oral)   Resp 16   Ht 5' 4.5\" (1.638 m)   Wt 215 lb 14.4 oz (97.9 kg)   SpO2 97% Comment: ra  Breastfeeding? 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   Extr:  Neg.  Neuro:  No asymmetry  Skin:  Warm/dry        Results:  Results for orders placed or performed in visit on 07/01/19   Chlamydia trachomatis & Neisseria gonorrhoeae, Amplified Detection   Result Value Ref Range    Chlamydia trachomatis, Amplified Detection Negative Negative    Neisseria gonorrhoeae, Amplified Detection Negative Negative   Wet Prep, Vaginal   Result Value Ref Range    Yeast Result No yeast seen No yeast seen    Trichomonas No Trichomonas seen No Trichomonas seen    Clue Cells, Wet Prep Clue cells seen (!) No Clue cells seen   Gynecologic Cytology (PAP Smear)   Result Value Ref Range    Case Report       Gynecologic Cytology Report                       Case: [id number]                                   Authorizing Provider:  Jono,         Collected:           07/01/2019 1632                              "        MD Lori                                                                 Ordering Location:     [facility]  Received:            07/01/2019 1632                                     Medicine/OB                                                                  First Screen:          Prudence Guallpa CT                                                                            (ASCP)                                                                       Rescreen:              Kathia Harmon CT                                                                               (ASCP)                                                                       Specimen:    SUREPATH PAP, SCREENING, Vaginal                                                           Interpretation  Negative for squamous intraepithelial lesion or malignancy.      Negative for squamous intraepithelial lesion or malignancy    Result Flag Normal Normal    Specimen Adequacy       Satisfactory for eval, endocerv/transformation zone component absent, vaginal source    HPV Reflex? Yes regardless of result     HIGH RISK No     LMP/Menopause Date 2006 - total hysterectomy     Abnormal Bleeding Yes     Pt Status total hysterectomy     Birth Control/Hormones None     Previous Normal/Date 2015     Prev Abn Date/Dx yes - prior to hysterectomy - severe dysplasia     Cervical Appearance na    HPV High Risk DNA Cervical   Result Value Ref Range    HPV Source SurePath     HPV16 DNA Negative NEG    HPV18 DNA Negative NEG    Other HR HPV Positive (!) NEG    Final Diagnosis SEE NOTES     Specimen Description Cervical Cells

## 2021-05-31 VITALS — WEIGHT: 206.31 LBS | BODY MASS INDEX: 34.87 KG/M2

## 2021-05-31 VITALS — WEIGHT: 209.5 LBS | BODY MASS INDEX: 35.41 KG/M2

## 2021-05-31 VITALS — HEIGHT: 65 IN | BODY MASS INDEX: 34.14 KG/M2 | WEIGHT: 204.9 LBS

## 2021-05-31 VITALS — HEIGHT: 64 IN | WEIGHT: 206.75 LBS | BODY MASS INDEX: 35.3 KG/M2

## 2021-05-31 VITALS — WEIGHT: 206 LBS | BODY MASS INDEX: 34.32 KG/M2 | HEIGHT: 65 IN

## 2021-06-01 ENCOUNTER — RECORDS - HEALTHEAST (OUTPATIENT)
Dept: ADMINISTRATIVE | Facility: CLINIC | Age: 41
End: 2021-06-01

## 2021-06-02 ENCOUNTER — RECORDS - HEALTHEAST (OUTPATIENT)
Dept: ADMINISTRATIVE | Facility: CLINIC | Age: 41
End: 2021-06-02

## 2021-06-02 VITALS — HEIGHT: 65 IN | WEIGHT: 206.06 LBS | BODY MASS INDEX: 34.33 KG/M2

## 2021-06-02 VITALS — WEIGHT: 212.56 LBS | BODY MASS INDEX: 35.42 KG/M2 | HEIGHT: 65 IN

## 2021-06-03 VITALS — WEIGHT: 215.9 LBS | HEIGHT: 65 IN | BODY MASS INDEX: 35.97 KG/M2

## 2021-06-03 VITALS
WEIGHT: 208 LBS | SYSTOLIC BLOOD PRESSURE: 112 MMHG | HEIGHT: 64 IN | TEMPERATURE: 97.9 F | HEART RATE: 80 BPM | OXYGEN SATURATION: 98 % | BODY MASS INDEX: 35.51 KG/M2 | DIASTOLIC BLOOD PRESSURE: 76 MMHG | RESPIRATION RATE: 16 BRPM

## 2021-06-03 VITALS — BODY MASS INDEX: 36.33 KG/M2 | WEIGHT: 215 LBS

## 2021-06-03 NOTE — TELEPHONE ENCOUNTER
RN cannot approve Refill Request    RN can NOT refill this medication med is not covered by policy/route to provider. Last office visit: 7/1/2019 Lori De La Cruz MD Last Physical: 11/8/2019 Last MTM visit: Visit date not found Last visit same specialty: 7/1/2019 Lori De La Cruz MD.  Next visit within 3 mo: Visit date not found  Next physical within 3 mo: Visit date not found      Shefali Ramirez, [facility] Connection Triage/Med Refill 11/11/2019    Requested Prescriptions   Pending Prescriptions Disp Refills     tiZANidine (ZANAFLEX) 4 MG tablet [Pharmacy Med Name: TIZANIDINE 4MG TABLETS] 30 tablet 0     Sig: TAKE 1 TABLET(4 MG) BY MOUTH AT BEDTIME AS NEEDED       There is no refill protocol information for this order
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
## 2021-06-04 VITALS
BODY MASS INDEX: 36.88 KG/M2 | SYSTOLIC BLOOD PRESSURE: 118 MMHG | HEART RATE: 80 BPM | OXYGEN SATURATION: 96 % | TEMPERATURE: 98 F | RESPIRATION RATE: 16 BRPM | WEIGHT: 216 LBS | HEIGHT: 64 IN | DIASTOLIC BLOOD PRESSURE: 74 MMHG

## 2021-06-04 VITALS — WEIGHT: 215 LBS | TEMPERATURE: 98.6 F | HEIGHT: 64 IN | BODY MASS INDEX: 36.7 KG/M2

## 2021-06-04 VITALS
SYSTOLIC BLOOD PRESSURE: 125 MMHG | RESPIRATION RATE: 16 BRPM | DIASTOLIC BLOOD PRESSURE: 84 MMHG | BODY MASS INDEX: 36.62 KG/M2 | WEIGHT: 215 LBS | HEART RATE: 108 BPM

## 2021-06-04 VITALS
WEIGHT: 207 LBS | BODY MASS INDEX: 35.53 KG/M2 | SYSTOLIC BLOOD PRESSURE: 106 MMHG | HEART RATE: 87 BPM | DIASTOLIC BLOOD PRESSURE: 72 MMHG

## 2021-06-04 NOTE — PROGRESS NOTES
"Preoperative Exam    Scheduled Procedure: Revision of Right Lapidus/Godwin, possible 1st TMT fusion  Surgery Date:  12/19/19  Surgery Location: [facility] Fax# [phone]    Surgeon:  Dr. Collins    Assessment/Plan:     1. Preop examination  Hemoglobin   2. S/P bunionectomy       This is a 38 yo female here for preop exam.  She had right foot bunionectomy on 1/17/19.  She apparently had nonunion of this repair.  She is now scheduled for additional surgical repair.  She is medically stable.  She has had previous hysterectomy so no pregnancy test is necessary.  Hemoglobin is ordered and is normal.  OK for surgery.     Surgical Procedure Risk: Low (reported cardiac risk generally < 1%)  Have you had prior anesthesia?: Yes  Have you or any family members had a previous anesthesia reaction:  No  Do you or any family members have a history of a clotting or bleeding disorder?: Yes: not the patient - Mom had a post op DVT; both grandmothers had some h/o \"clots\"; paternal aunt with blood clot  Cardiac Risk Assessment: no increased risk for major cardiac complications    APPROVAL GIVEN to proceed with proposed procedure, without further diagnostic evaluation    Please Note:  no special considerations    Functional Status: Independent  Patient plans to recover at home with family.     Subjective:      Alanna Pederson is a 39 y.o. female who presents for a preoperative consultation.    Had previous surgery (January 2019) for right foot bunionectomy - fusion didn't happen  Will be out at least 3-4 weeks from work   Non weight bearing 3 weeks -     Ongoing pain/swelling -     All other systems reviewed and are negative, other than those listed in the HPI.    Pertinent History  Do you have difficulty breathing or chest pain after walking up a flight of stairs: No  History of obstructive sleep apnea: No  Steroid use in the last 6 months: No  Frequent Aspirin/NSAID use: No  Prior Blood Transfusion: Yes: high " school  Prior Blood Transfusion Reaction: No  If for some reason prior to, during or after the procedure, if it is medically indicated, would you be willing to have a blood transfusion?:  There is no transfusion refusal.    Current Outpatient Medications   Medication Sig Dispense Refill     azelastine (ASTELIN) 137 mcg (0.1 %) nasal spray 2 sprays into each nostril 2 (two) times a day. Use in each nostril as directed 30 mL 9     cetirizine (ZYRTEC) 10 MG tablet Take 1 tablet (10 mg total) by mouth daily. 90 tablet 3     CHOLECALCIFEROL, VITAMIN D3, (VITAMIN D3 ORAL) Take 1 capsule by mouth daily. 5000       ibuprofen (ADVIL,MOTRIN) 800 MG tablet Take 800 mg by mouth every 6 (six) hours as needed for pain.       levalbuterol (XOPENEX HFA) 45 mcg/actuation inhaler Inhale 1-2 puffs every 4 (four) hours as needed for wheezing. 1 Inhaler 12     loratadine (CLARITIN) 10 mg tablet Take 1 tablet (10 mg total) by mouth daily. 90 tablet 3     mometasone-formoterol (DULERA) 100-5 mcg/actuation HFAA inhaler Inhale 2 puffs 2 (two) times a day. 1 Inhaler 1     montelukast (SINGULAIR) 10 mg tablet TAKE ONE TABLET BY MOUTH ONE TIME DAILY 90 tablet 3     tiZANidine (ZANAFLEX) 4 MG tablet TAKE 1 TABLET(4 MG) BY MOUTH AT BEDTIME AS NEEDED 30 tablet 0     No current facility-administered medications for this visit.         Allergies   Allergen Reactions     Naproxen Other (See Comments)     Hand swelling     Other Drug Allergy (See Comments)      Kristina     Penicillins Hives     Prednisone      Other: Redness and extreme irritability         Patient Active Problem List   Diagnosis     Overweight     Migraine Headache     Vitamin D Deficiency     Generalized Osteoarthritis Of Multiple Sites     Joint Pain, Localized In The Wrist     Fibromyalgia     Carpal Tunnel Syndrome     Allergic Rhinitis     Lump In / On The Skin     Tingling (Paresthesia)     Abdominal Pain     Asthma     Allergic Rhinitis Due To Ragweed Pollen     Allergic  Rhinitis Due To Cats     Allergic Rhinitis Due To Mold     Cervical strain     S/P hysterectomy     Dense breast tissue on mammogram     Family history of malignant neoplasm of breast     Raynaud's syndrome     Bunion, right     S/P bunionectomy       Past Medical History:   Diagnosis Date     Abnormal Pap smear of cervix     hysterectomy, needs vaginal pap every 5 yrs     Acne Vulgaris     Created by Conversion      Concussion 12/21/2014     Ovarian cyst     Right       Past Surgical History:   Procedure Laterality Date     CYSTECTOMY Right     several     HYSTERECTOMY  2005     LAPAROSCOPIC VAGINAL HYSTERECTOMY  3/2005    for cervical cancer, needs vaginal pap every 5 yrs     TONSILLECTOMY  1992     TUBAL LIGATION  2002       Social History     Socioeconomic History     Marital status:      Spouse name: Not on file     Number of children: Not on file     Years of education: Not on file     Highest education level: Not on file   Occupational History     Not on file   Social Needs     Financial resource strain: Not on file     Food insecurity:     Worry: Not on file     Inability: Not on file     Transportation needs:     Medical: Not on file     Non-medical: Not on file   Tobacco Use     Smoking status: Never Smoker     Smokeless tobacco: Never Used   Substance and Sexual Activity     Alcohol use: Yes     Comment: rarely     Drug use: No     Sexual activity: Yes     Partners: Male     Birth control/protection: None     Comment: hysterectomy   Lifestyle     Physical activity:     Days per week: Not on file     Minutes per session: Not on file     Stress: Not on file   Relationships     Social connections:     Talks on phone: Not on file     Gets together: Not on file     Attends Hindu service: Not on file     Active member of club or organization: Not on file     Attends meetings of clubs or organizations: Not on file     Relationship status: Not on file     Intimate partner violence:     Fear of current  "or ex partner: Not on file     Emotionally abused: Not on file     Physically abused: Not on file     Forced sexual activity: Not on file   Other Topics Concern     Not on file   Social History Narrative     Not on file       Patient Care Team:  Lori De La Cruz MD as PCP - General (Family Medicine)  Lori De La Cruz MD as Assigned PCP          Objective:     Vitals:    12/09/19 0916   BP: 118/74   Pulse: 80   Resp: 16   Temp: 98  F (36.7  C)   TempSrc: Oral   SpO2: 96%   Weight: 216 lb (98 kg)   Height: 5' 4.25\" (1.632 m)         Physical Exam:  Physical Exam    There are no Patient Instructions on file for this visit.        Labs:  Recent Results (from the past 24 hour(s))   Hemoglobin    Collection Time: 12/09/19 10:01 AM   Result Value Ref Range    Hemoglobin 13.2 12.0 - 16.0 g/dL       Immunization History   Administered Date(s) Administered     DT (pediatric) 01/01/1999     Hep A, historic 03/01/2007, 09/12/2007     Hep B, historic 01/01/1900, 01/01/1996, 02/01/1996, 06/01/1996     Influenza, inj, historic,unspecified 11/04/2008     Influenza, seasonal,quad inj 6-35 mos 10/19/2012     Influenza,seasonal quad, PF 10/09/2013     Influenza,seasonal quad, PF, =/> 6months 11/08/2019     MMR 01/01/1900, 02/15/2001     Rubella 01/01/1900, 03/28/2001     Td,adult,historic,unspecified 01/01/1990, 07/31/2008     Tdap 03/01/2007, 09/11/2014           Electronically signed by Lori De La Cruz MD 12/09/19 9:17 AM  "

## 2021-06-06 NOTE — PROGRESS NOTES
"ASSESSMENT/PLAN:  1. Facial flushing  HM1(CBC and Differential)    Hepatic Profile    Urinalysis    Follicle Stimulating Hormone (FSH)    Antinuclear Antibodies Screen (ALINE)    Erythrocyte Sedimentation Rate    Rheumatoid Factor Quant    CCP Antibodies    C-Reactive Protein(CRP)    HM1 (CBC with Diff)   2. Menopausal flushing         This is a 40 yo female with:  1.  Concerns of facial flushing - she notes that she frequently has a red/flushed face - has history of increased inflammatory labs - has been diagnosed in past with \"fibromyalgia\" - doesn't think that is a real diagnosis and wonders if there is something else going on.  Symptoms are vague and signs are more vague presently.  Will recheck labs, concentrating on inflammation.  Discussed with patient.  It is possible that this is some sort of menopausal flushing as well (patient has had previous hysterectomy so no periods to designate menopause).    Return in about 3 months (around 6/6/2020) for if not getting better.      There are no discontinued medications.  There are no Patient Instructions on file for this visit.    Chief Complaint:  Chief Complaint   Patient presents with     facial flushing       HPI:   Alanna Pederson is a 39 y.o. female c/o  Facial flushing - at least 2 weeks -   Randomly gets facial flushing -   Face bright red - feels sunburned  Tried to pay attention  No new exposures - soaps, foods,     Has h/o autoimmune labs being \"off\"  When it happens - heart kind of races  After it goes away - hives, then goes away    At least 4x/week for last 2 weeks if not 3-4 weeks        PMH:   Patient Active Problem List    Diagnosis Date Noted     S/P bunionectomy 12/09/2019     Bunion, right 01/09/2019     Dense breast tissue on mammogram 09/25/2018     Family history of malignant neoplasm of breast 09/25/2018     Raynaud's syndrome 09/25/2018     S/P hysterectomy 10/09/2017     Cervical strain 12/17/2014     Abdominal Pain      Asthma      Allergic " Rhinitis Due To Ragweed Pollen      Allergic Rhinitis Due To Cats      Allergic Rhinitis Due To Mold      Overweight      Migraine Headache      Vitamin D Deficiency      Generalized Osteoarthritis Of Multiple Sites      Joint Pain, Localized In The Wrist      Fibromyalgia      Carpal Tunnel Syndrome      Allergic Rhinitis      Lump In / On The Skin      Tingling (Paresthesia)      Past Medical History:   Diagnosis Date     Abnormal Pap smear of cervix     hysterectomy, needs vaginal pap every 5 yrs     Acne Vulgaris     Created by Conversion      Concussion 12/21/2014     Ovarian cyst     Right     Past Surgical History:   Procedure Laterality Date     CYSTECTOMY Right     several     HYSTERECTOMY  2005     LAPAROSCOPIC VAGINAL HYSTERECTOMY  3/2005    for cervical cancer, needs vaginal pap every 5 yrs     TONSILLECTOMY  1992     TUBAL LIGATION  2002     Social History     Socioeconomic History     Marital status:      Spouse name: Not on file     Number of children: Not on file     Years of education: Not on file     Highest education level: Not on file   Occupational History     Not on file   Social Needs     Financial resource strain: Not on file     Food insecurity     Worry: Not on file     Inability: Not on file     Transportation needs     Medical: Not on file     Non-medical: Not on file   Tobacco Use     Smoking status: Never Smoker     Smokeless tobacco: Never Used   Substance and Sexual Activity     Alcohol use: Yes     Comment: rarely     Drug use: No     Sexual activity: Yes     Partners: Male     Birth control/protection: None     Comment: hysterectomy   Lifestyle     Physical activity     Days per week: Not on file     Minutes per session: Not on file     Stress: Not on file   Relationships     Social connections     Talks on phone: Not on file     Gets together: Not on file     Attends Buddhist service: Not on file     Active member of club or organization: Not on file     Attends meetings  of clubs or organizations: Not on file     Relationship status: Not on file     Intimate partner violence     Fear of current or ex partner: Not on file     Emotionally abused: Not on file     Physically abused: Not on file     Forced sexual activity: Not on file   Other Topics Concern     Not on file   Social History Narrative     Not on file     Family History   Problem Relation Age of Onset     Breast cancer Paternal Aunt 30     Heart disease Paternal Aunt      Breast cancer Paternal Grandmother      Heart disease Paternal Grandmother      Diabetes Paternal Grandmother      Stroke Paternal Grandmother      Cervical cancer Mother      Hashimoto's thyroiditis Mother      Heart attack Father 40     Heart attack Paternal Grandfather      Diabetes Maternal Grandmother      Stroke Maternal Grandmother      Bipolar disorder Daughter      ADD / ADHD Daughter      Uterine cancer Paternal Aunt        Meds:    Current Outpatient Medications:      azelastine (ASTELIN) 137 mcg (0.1 %) nasal spray, 2 sprays into each nostril 2 (two) times a day. Use in each nostril as directed, Disp: 30 mL, Rfl: 9     cetirizine (ZYRTEC) 10 MG tablet, Take 1 tablet (10 mg total) by mouth daily., Disp: 90 tablet, Rfl: 3     CHOLECALCIFEROL, VITAMIN D3, (VITAMIN D3 ORAL), Take 1 capsule by mouth daily. 5000, Disp: , Rfl:      ibuprofen (ADVIL,MOTRIN) 800 MG tablet, Take 800 mg by mouth every 6 (six) hours as needed for pain., Disp: , Rfl:      levalbuterol (XOPENEX HFA) 45 mcg/actuation inhaler, Inhale 1-2 puffs every 4 (four) hours as needed for wheezing., Disp: 1 Inhaler, Rfl: 12     loratadine (CLARITIN) 10 mg tablet, Take 1 tablet (10 mg total) by mouth daily., Disp: 90 tablet, Rfl: 3     mometasone-formoterol (DULERA) 100-5 mcg/actuation HFAA inhaler, Inhale 2 puffs 2 (two) times a day., Disp: 1 Inhaler, Rfl: 1     montelukast (SINGULAIR) 10 mg tablet, TAKE ONE TABLET BY MOUTH ONE TIME DAILY, Disp: 90 tablet, Rfl: 3     tiZANidine  (ZANAFLEX) 4 MG tablet, TAKE 1 TABLET(4 MG) BY MOUTH AT BEDTIME AS NEEDED, Disp: 30 tablet, Rfl: 0    Allergies:  Allergies   Allergen Reactions     Naproxen Other (See Comments)     Hand swelling     Other Drug Allergy (See Comments)      Kristina     Penicillins Hives     Prednisone      Other: Redness and extreme irritability         ROS:  Pertinent positives as noted in HPI; otherwise 12 point ROS negative.      Physical Exam:  EXAM:  /84 (Patient Site: Right Arm, Patient Position: Sitting, Cuff Size: Adult Regular)   Pulse (!) 108   Resp 16   Wt 215 lb (97.5 kg)   BMI 36.62 kg/m     Gen:  NAD, appears well, well-hydrated  HEENT:  TMs nl, oropharynx benign, nasal mucosa nl, conjunctiva clear  Neck:  Supple, no adenopathy, no thyromegaly, no carotid bruits, no JVD  Lungs:  Clear to auscultation bilaterally  Cor:  RRR no murmur  Abd:  Soft, nontender, BS+, no masses, no guarding or rebound, no HSM  Extr:  Neg.  Neuro:  No asymmetry  Skin:  Warm/dry        Results:  Results for orders placed or performed in visit on 03/06/20   Hepatic Profile   Result Value Ref Range    Bilirubin, Total 0.3 0.0 - 1.0 mg/dL    Bilirubin, Direct <0.1 <=0.5 mg/dL    Protein, Total 7.1 6.0 - 8.0 g/dL    Albumin 4.1 3.5 - 5.0 g/dL    Alkaline Phosphatase 87 45 - 120 U/L    AST 20 0 - 40 U/L    ALT 24 0 - 45 U/L   Urinalysis   Result Value Ref Range    Color, UA Yellow Colorless, Yellow, Straw, Light Yellow    Clarity, UA Clear Clear    Glucose, UA Negative Negative    Bilirubin, UA Negative Negative    Ketones, UA Negative Negative    Specific Gravity, UA 1.010 1.005 - 1.030    Blood, UA Trace (!) Negative    pH, UA 5.5 5.0 - 8.0    Protein, UA Negative Negative mg/dL    Urobilinogen, UA 0.2 E.U./dL 0.2 E.U./dL, 1.0 E.U./dL    Nitrite, UA Negative Negative    Leukocytes, UA Negative Negative    Bacteria, UA Few (!) None Seen hpf    RBC, UA 0-2 None Seen, 0-2 hpf    WBC, UA None Seen None Seen, 0-5 hpf    Squam Epithel, UA 0-5  None Seen, 0-5 lpf   Follicle Stimulating Hormone (FSH)   Result Value Ref Range    FSH 3.1 mIU/mL   Antinuclear Antibodies Screen (ALINE)   Result Value Ref Range    ALINE Screen Cascade 3.7 (H) <=2.9 U   Erythrocyte Sedimentation Rate   Result Value Ref Range    Sed Rate 14 0 - 20 mm/hr   Rheumatoid Factor Quant   Result Value Ref Range    Rheumatoid Factor Quantitative <15.0 0 - 30 IU/mL   CCP Antibodies   Result Value Ref Range    CCP IgG Antibodies <0.5 <=4.9 U/mL   C-Reactive Protein(CRP)   Result Value Ref Range    CRP 0.4 0.0 - 0.8 mg/dL   HM1 (CBC with Diff)   Result Value Ref Range    WBC 7.8 4.0 - 11.0 thou/uL    RBC 4.23 3.80 - 5.40 mill/uL    Hemoglobin 13.4 12.0 - 16.0 g/dL    Hematocrit 40.0 35.0 - 47.0 %    MCV 95 80 - 100 fL    MCH 31.7 27.0 - 34.0 pg    MCHC 33.6 32.0 - 36.0 g/dL    RDW 11.1 11.0 - 14.5 %    Platelets 369 140 - 440 thou/uL    MPV 6.9 (L) 7.0 - 10.0 fL    Neutrophils % 55 50 - 70 %    Lymphocytes % 35 20 - 40 %    Monocytes % 7 2 - 10 %    Eosinophils % 2 0 - 6 %    Basophils % 1 0 - 2 %    Neutrophils Absolute 4.3 2.0 - 7.7 thou/uL    Lymphocytes Absolute 2.8 0.8 - 4.4 thou/uL    Monocytes Absolute 0.6 0.0 - 0.9 thou/uL    Eosinophils Absolute 0.1 0.0 - 0.4 thou/uL    Basophils Absolute 0.1 0.0 - 0.2 thou/uL

## 2021-06-07 NOTE — PROGRESS NOTES
"Alanna Pederson is a 39 y.o. female who is being evaluated via a billable telephone visit.      The patient has been notified of following:     \"This telephone visit will be conducted via a call between you and your physician/provider. We have found that certain health care needs can be provided without the need for a physical exam.  This service lets us provide the care you need with a short phone conversation.  If a prescription is necessary we can send it directly to your pharmacy.  If lab work is needed we can place an order for that and you can then stop by our lab to have the test done at a later time.    Telephone visits are billed at different rates depending on your insurance coverage. During this emergency period, for some insurers they may be billed the same as an in-person visit.  Please reach out to your insurance provider with any questions.    If during the course of the call the physician/provider feels a telephone visit is not appropriate, you will not be charged for this service.\"    Patient has given verbal consent to a Telephone visit? yes    Patient would like to receive their AVS by [facility]    Additional provider notes: This is a pleasant 39 year old woman with a history of allergies and asthma following via telephone today for refills.  She reports that last 2 weeks, she has had increased nasal congestion and drainage.  She continue on either Zyrtec or Claritin and montelukast.  This does help.  She is intolerant of nasal sprays.  She does use nasal rinses.  Asthma has been controlled.  No cough, wheeze or shortness of breath.  She uses Dulera seasonally during spring/fall, 100/5 2 puffs twice daily.  This controls her symptoms well.       Review of systems performed and otherwise negative.      Impression report and plan:    1.  Allergic rhinitis  2.  Intermittent asthma    Okay to continue Dulera seasonally and then as needed per BENNIE guidelines. Refilled montelukast and antihistamines. " Follow yearly      Phone call duration: 8 minutes    Radha Keys, CMA

## 2021-06-08 NOTE — TELEPHONE ENCOUNTER
New Appointment Needed  What is the reason for the visit:    PAP  Provider Preference: PCP only  How soon do you need to be seen?: before end of June for abnormal no insurance after June  Waitlist offered?: No  Okay to leave a detailed message:  Yes

## 2021-06-08 NOTE — TELEPHONE ENCOUNTER
LOV was a virtual visit 4.13.20    PS I DID NOT CHANGE THE REFILLS STILL SAYS 12  NOT SURE WHAT YOU WANTED IT TO BE AT

## 2021-06-09 NOTE — PROGRESS NOTES
Chief complaint: Follow-up allergies    History of present illness: This is a pleasant 36-year-old woman who is here today for follow-up of her allergies.  She has been doing quite well on Zyrtec, Singulair and Patanase nasal spray.  She does note that the Patanase, however, does give her a headache.  She has tried intranasal corticosteroids in the past without much success.  She denies any cough, wheeze or shortness of breath.  She states recently she has had some itchy eyes and drainage.    Past medical history, social history, family medical history, meds and allergies reviewed and updated accordingly.    Review of Systems performed as above and the remainder is negative.      Current Outpatient Prescriptions:      cetirizine (ZYRTEC) 10 MG tablet, Take 1 tablet (10 mg total) by mouth daily., Disp: 90 tablet, Rfl: 3     CHOLECALCIFEROL, VITAMIN D3, (VITAMIN D3 ORAL), Take 1 capsule by mouth daily. 5000, Disp: , Rfl:      cyclobenzaprine (FLEXERIL) 10 MG tablet, Take 1 tablet (10 mg total) by mouth 2 (two) times a day as needed for muscle spasms., Disp: 30 tablet, Rfl: 1     ibuprofen (ADVIL,MOTRIN) 800 MG tablet, Take 800 mg by mouth every 6 (six) hours as needed for pain., Disp: , Rfl:      montelukast (SINGULAIR) 10 mg tablet, TAKE ONE TABLET BY MOUTH ONE TIME DAILY, Disp: 90 tablet, Rfl: 3     olopatadine (PATANASE) 0.6 % Spry, 2 sprays per nostril twice daily, Disp: 1 Bottle, Rfl: 6     azelastine (ASTELIN) 137 mcg (0.1 %) nasal spray, 2 sprays into each nostril 2 (two) times a day. Use in each nostril as directed, Disp: 30 mL, Rfl: 9    Allergies   Allergen Reactions     Penicillins Hives     Prednisone      Other: Redness and extreme irritability         /75  Pulse 90  SpO2 100%  Gen: Pleasant female not in acute distress  HEENT: Eyes no erythema of the bulbar or palpebral conjunctiva, no edema. Ears: TMs well visualized, no effusions. Nose: No congestion, mucosa normal. Mouth: Throat clear, no lip  or tongue edema.   Cardiac: Regular rate and rhythm, no murmurs, rubs or gallops  Respiratory: Clear to auscultation bilaterally, no adventitious breath sounds    Skin: No rashes or lesions  Psych: Alert and oriented times 3      Impression report and plan:    1.  Allergic rhinitis    I refilled montelukast, Zyrtec.  I suggested the use of Astelin nasal spray instead of Patanase to see if this helps with her headaches.  Okay to double dose Zyrtec as long as she is not pregnant.  Follow as needed.  Notify if symptom are not well controlled.

## 2021-06-09 NOTE — PROGRESS NOTES
ASSESSMENT/PLAN:  1. High risk HPV infection  Gynecologic Cytology (PAP Smear)    HPV High Risk DNA Cervical       This is a 41 yo female with h/o hysterectomy in past.  She has recent h/o positive high risk HPV on pap smear.  Here today for pap smear follow up.  Pap smear with HPV screening is collected and sent from the vaginal cuff.  We will make further plans once results are known    Return in about 1 year (around 6/25/2021) for pap smear.      Medications Discontinued During This Encounter   Medication Reason     mometasone-formoterol (DULERA) 100-5 mcg/actuation HFAA inhaler Therapy completed     There are no Patient Instructions on file for this visit.    Chief Complaint:  Chief Complaint   Patient presents with     pap smear       HPI:   Alanna Pederson is a 40 y.o. female c/o  Had previous pap smear that showed high risk HPV  No new partners  H/o hysterectomy (not for cancer)      PMH:   Patient Active Problem List    Diagnosis Date Noted     S/P bunionectomy 12/09/2019     Bunion, right 01/09/2019     Dense breast tissue on mammogram 09/25/2018     Family history of malignant neoplasm of breast 09/25/2018     Raynaud's syndrome 09/25/2018     S/P hysterectomy 10/09/2017     Cervical strain 12/17/2014     Abdominal Pain      Asthma      Allergic Rhinitis Due To Ragweed Pollen      Allergic Rhinitis Due To Cats      Allergic Rhinitis Due To Mold      Overweight      Migraine Headache      Vitamin D Deficiency      Generalized Osteoarthritis Of Multiple Sites      Joint Pain, Localized In The Wrist      Fibromyalgia      Carpal Tunnel Syndrome      Allergic Rhinitis      Lump In / On The Skin      Tingling (Paresthesia)      Past Medical History:   Diagnosis Date     Abnormal Pap smear of cervix     hysterectomy, needs vaginal pap every 5 yrs     Acne Vulgaris     Created by Conversion      Concussion 12/21/2014     Ovarian cyst     Right     Past Surgical History:   Procedure Laterality Date     CYSTECTOMY  Right     several     HYSTERECTOMY  2005     LAPAROSCOPIC VAGINAL HYSTERECTOMY  3/2005    for cervical cancer, needs vaginal pap every 5 yrs     TONSILLECTOMY  1992     TUBAL LIGATION  2002     Social History     Socioeconomic History     Marital status:      Spouse name: Not on file     Number of children: Not on file     Years of education: Not on file     Highest education level: Not on file   Occupational History     Not on file   Social Needs     Financial resource strain: Not on file     Food insecurity     Worry: Not on file     Inability: Not on file     Transportation needs     Medical: Not on file     Non-medical: Not on file   Tobacco Use     Smoking status: Never Smoker     Smokeless tobacco: Never Used     Tobacco comment: boyfriend smokes    Substance and Sexual Activity     Alcohol use: Yes     Comment: rarely     Drug use: No     Sexual activity: Yes     Partners: Male     Birth control/protection: None     Comment: hysterectomy   Lifestyle     Physical activity     Days per week: Not on file     Minutes per session: Not on file     Stress: Not on file   Relationships     Social connections     Talks on phone: Not on file     Gets together: Not on file     Attends Baptism service: Not on file     Active member of club or organization: Not on file     Attends meetings of clubs or organizations: Not on file     Relationship status: Not on file     Intimate partner violence     Fear of current or ex partner: Not on file     Emotionally abused: Not on file     Physically abused: Not on file     Forced sexual activity: Not on file   Other Topics Concern     Not on file   Social History Narrative     Not on file     Family History   Problem Relation Age of Onset     Breast cancer Paternal Aunt 30     Heart disease Paternal Aunt      Breast cancer Paternal Grandmother      Heart disease Paternal Grandmother      Diabetes Paternal Grandmother      Stroke Paternal Grandmother      Cervical cancer  Mother      Hashimoto's thyroiditis Mother      Heart attack Father 40     Heart attack Paternal Grandfather      Diabetes Maternal Grandmother      Stroke Maternal Grandmother      Bipolar disorder Daughter      ADD / ADHD Daughter      Uterine cancer Paternal Aunt        Meds:    Current Outpatient Medications:      cetirizine (ZYRTEC) 10 MG tablet, Take 1 tablet (10 mg total) by mouth daily., Disp: 90 tablet, Rfl: 3     CHOLECALCIFEROL, VITAMIN D3, (VITAMIN D3 ORAL), Take 1 capsule by mouth daily. 5000, Disp: , Rfl:      ibuprofen (ADVIL,MOTRIN) 800 MG tablet, Take 800 mg by mouth every 6 (six) hours as needed for pain., Disp: , Rfl:      levalbuterol (XOPENEX HFA) 45 mcg/actuation inhaler, Inhale 1-2 puffs every 4 (four) hours as needed for wheezing., Disp: 1 Inhaler, Rfl: 0     loratadine (CLARITIN) 10 mg tablet, Take 1 tablet (10 mg total) by mouth daily., Disp: 90 tablet, Rfl: 3     montelukast (SINGULAIR) 10 mg tablet, TAKE ONE TABLET BY MOUTH ONE TIME DAILY, Disp: 90 tablet, Rfl: 3     tiZANidine (ZANAFLEX) 4 MG tablet, TAKE 1 TABLET(4 MG) BY MOUTH AT BEDTIME AS NEEDED, Disp: 30 tablet, Rfl: 0     azelastine (ASTELIN) 137 mcg (0.1 %) nasal spray, 2 sprays into each nostril 2 (two) times a day. Use in each nostril as directed, Disp: 30 mL, Rfl: 9    Allergies:  Allergies   Allergen Reactions     Naproxen Other (See Comments)     Hand swelling     Other Drug Allergy (See Comments)      Kristina     Penicillins Hives     Prednisone      Other: Redness and extreme irritability         ROS:  Pertinent positives as noted in HPI; otherwise 12 point ROS negative.      Physical Exam:  EXAM:  /72 (Patient Site: Right Arm, Patient Position: Sitting, Cuff Size: Adult Large)   Pulse 87   Wt 207 lb (93.9 kg)   BMI 35.53 kg/m     Gen:  NAD, appears well, well-hydrated  HEENT:  TMs nl, oropharynx benign, nasal mucosa nl, conjunctiva clear  Neck:  Supple, no adenopathy, no thyromegaly, no carotid bruits, no  JVD  Lungs:  Clear to auscultation bilaterally  Cor:  RRR no murmur  Abd:  Soft, nontender, BS+, no masses, no guarding or rebound, no HSM  PELVIC EXAM:External genitalia: normal  Vaginal mucosa normal  Vaginal discharge: minimal white  Speculum exam shows absent cervix .   Bimanual exam: No adnexal masses or tenderness.   Extr:  Neg.  Neuro:  No asymmetry  Skin:  Warm/dry        Results:  Results for orders placed or performed in visit on 06/25/20   Gynecologic Cytology (PAP Smear)   Result Value Ref Range    Case Report       Gynecologic Cytology Report                       Case: [id number]                                   Authorizing Provider:  Jono,         Collected:           06/25/2020 [facility]                                     MD Lori                                                                 Ordering Location:     [facility]  Received:            06/25/2020 [facility]                                     Medicine/OB                                                                  First Screen:          Kathia Harmon, CT                                                                               (ASCP)                                                                       Rescreen:              Kaitlin Thompson, CT                                                                           (ASCP)                                                                       Specimen:    SUREPATH PAP, SCREENING, Endocervical/cervical                                             Interpretation  Negative for squamous intraepithelial lesion or malignancy.      Negative for squamous intraepithelial lesion or malignancy    Result Flag Normal Normal    Specimen Adequacy       Satisfactory for evaluation, endocervical/transformation zone component absent    HPV Reflex? Yes regardless of result     HIGH RISK No     LMP/Menopause Date hysterectomy     Abnormal Bleeding No     Pt Status  total hysterectomy     Birth Control/Hormones None     Previous Normal/Date 2015     Prev Abn Date/Dx 2019 - positive HPV     Cervical Appearance na    HPV High Risk DNA Cervical   Result Value Ref Range    HPV Source SurePath     HPV16 DNA Negative NEG    HPV18 DNA Negative NEG    Other HR HPV Negative NEG    Final Diagnosis SEE NOTES     Specimen Description Cervical Cells

## 2021-06-10 NOTE — PROGRESS NOTES
Subjective:      Alanna Pederson is a 36 y.o. female who presents for evaluation of infected cyst.  She has a history of cysts in the past.  She has never had an infected one before.  She has a cyst on the top of her head.  It has come and gone over time.  2 days ago she started to notice a little bit of drainage coming from the cyst.  It has also started to become painful to the touch and slightly warm.  No fevers.    Patient Active Problem List   Diagnosis     Acne Vulgaris     Overweight     Migraine Headache     Vitamin D Deficiency     Generalized Osteoarthritis Of Multiple Sites     Joint Pain, Localized In The Wrist     Fibromyalgia     Carpal Tunnel Syndrome     Allergic Rhinitis     Lump In / On The Skin     Tingling (Paresthesia)     Abdominal Pain     Exercise-induced Asthma     Allergic Rhinitis Due To Ragweed Pollen     Allergic Rhinitis Due To Cats     Allergic Rhinitis Due To Mold     Postconcussion syndrome     Cervical strain     Concussion       Current Outpatient Prescriptions:      azelastine (ASTELIN) 137 mcg (0.1 %) nasal spray, 2 sprays into each nostril 2 (two) times a day. Use in each nostril as directed, Disp: 30 mL, Rfl: 9     cetirizine (ZYRTEC) 10 MG tablet, Take 1 tablet (10 mg total) by mouth daily., Disp: 90 tablet, Rfl: 3     CHOLECALCIFEROL, VITAMIN D3, (VITAMIN D3 ORAL), Take 1 capsule by mouth daily. 5000, Disp: , Rfl:      cyclobenzaprine (FLEXERIL) 10 MG tablet, Take 1 tablet (10 mg total) by mouth 2 (two) times a day as needed for muscle spasms., Disp: 30 tablet, Rfl: 1     ibuprofen (ADVIL,MOTRIN) 800 MG tablet, Take 800 mg by mouth every 6 (six) hours as needed for pain., Disp: , Rfl:      montelukast (SINGULAIR) 10 mg tablet, TAKE ONE TABLET BY MOUTH ONE TIME DAILY, Disp: 90 tablet, Rfl: 3     sulfamethoxazole-trimethoprim (SEPTRA DS) 800-160 mg per tablet, Take 1 tablet by mouth 2 (two) times a day for 10 days., Disp: 20 tablet, Rfl: 0     Objective:      Allergies:  Penicillins and Prednisone    Vitals:  Vitals:    04/24/17 1607   BP: 110/84   Pulse: 80     Body mass index is 33.47 kg/(m^2).    Vital signs reviewed.  General: Patient is alert and oriented x 3, in no apparent distress  Cardiac: regular rate and rhythm, no murmurs  Pulmonary: lungs clear to auscultation bilaterally, no crackles, rales, rhonchi, or wheezing noted  Skin: Small firm subcutaneous mass present on the top of her head to the left of midline, approximately 1-2 cm in diameter, no fluctuance, no pores or drainage, unable to express fluid with gentle pressure, skin above the affected area does not appear significantly erythematous, mild pain with direct palpation    Assessment and Plan:   1.  Cellulitis, probable infected cyst.  Patient has a penicillin allergy.  She is in the process of seeing allergy to retest and see if she is indeed allergic to penicillin.  In the meantime, prescription sent for Bactrim.  Follow up as needed.    This dictation uses voice recognition software, which may contain typographical errors.

## 2021-06-12 NOTE — PROGRESS NOTES
ASSESSMENT/PLAN:  1. RUQ abdominal pain  HM1(CBC and Differential)    Comprehensive Metabolic Panel    Urinalysis    Culture, Urine    US Abdomen Limited    ondansetron (ZOFRAN-ODT) 4 MG disintegrating tablet    HYDROcodone-acetaminophen 5-325 mg per tablet    HM1 (CBC with Diff)    CT Abdomen Pelvis Without Oral Without IV Contrast       This is a 37-year-old female, with now recurrent right upper abdominal pain.  This radiates toward the right flank.  She has had at least 2-3 previous episodes very similar in nature.  There is been no direct diagnosis made in the past, and again today diagnosis remains a bit elusive.  She is had what sounds like gallbladder symptoms, but no clear evidence of gallbladder disease.  Her symptoms began in the right upper quadrant, do seem to be worsened by food intake, and are very uncomfortable at the peak of her symptoms.  I think it is reasonable to start again with right upper quadrant ultrasound, and labs.  We will also do a CT scan of the abdomen and pelvis, and see if there is perhaps urinary tract stone causing symptoms as well.  Patient is agreeable to plan, and understands.  We will give ondansetron for as needed nausea control and hydrocodone for pain control.  We have reviewed her previous workups, reviewing old records both in epic and in previous all scripts.      There are no discontinued medications.  There are no Patient Instructions on file for this visit.    Chief Complaint:  Chief Complaint   Patient presents with     Abdominal Pain     x2 weeks       HPI:   Alanna Pederson is a 37 y.o. female c/o  Had acute RUQ pain a couple weeks ago -   Wasn't seen initially  Got better for a couple days    Now postprandial pain -   Started in epigastrium  Now moved to RUQ  -     Had similar episode 6 years ago or so - had missed a week of work -   Had HIDA scan     Nausea, nothing tastes good, no vomiting  Stools more loose - not dark, milky kind of, smell is different,  gassy  Pain is definitely up under ribs -    No fever  Hasn't eaten much for days; ate granola yesterday    Has actually had 3 episodes of pain - 2012, 2014, now  This is worst      PMH:   Patient Active Problem List    Diagnosis Date Noted     Concussion 12/21/2014     Postconcussion syndrome 12/17/2014     Cervical strain 12/17/2014     Abdominal Pain      Exercise-induced Asthma      Allergic Rhinitis Due To Ragweed Pollen      Allergic Rhinitis Due To Cats      Allergic Rhinitis Due To Mold      Acne Vulgaris      Overweight      Migraine Headache      Vitamin D Deficiency      Generalized Osteoarthritis Of Multiple Sites      Joint Pain, Localized In The Wrist      Fibromyalgia      Carpal Tunnel Syndrome      Allergic Rhinitis      Lump In / On The Skin      Tingling (Paresthesia)      Past Medical History:   Diagnosis Date     Abnormal Pap smear of cervix     hysterectomy, needs vaginal pap every 5 yrs     Ovarian cyst     Right     Past Surgical History:   Procedure Laterality Date     CYSTECTOMY Right     several     HYSTERECTOMY  2005     LAPAROSCOPIC VAGINAL HYSTERECTOMY  3/2005    for cervical cancer, needs vaginal pap every 5 yrs     TONSILLECTOMY  1992     TUBAL LIGATION  2002     Social History     Social History     Marital status:      Spouse name: N/A     Number of children: N/A     Years of education: N/A     Occupational History     Not on file.     Social History Main Topics     Smoking status: Never Smoker     Smokeless tobacco: Never Used     Alcohol use Yes      Comment: rarely     Drug use: No     Sexual activity: Yes     Partners: Male     Birth control/ protection: None      Comment: hysterectomy     Other Topics Concern     Not on file     Social History Narrative       Meds:    Current Outpatient Prescriptions:      azelastine (ASTELIN) 137 mcg (0.1 %) nasal spray, 2 sprays into each nostril 2 (two) times a day. Use in each nostril as directed, Disp: 30 mL, Rfl: 9     cetirizine  "(ZYRTEC) 10 MG tablet, Take 1 tablet (10 mg total) by mouth daily., Disp: 90 tablet, Rfl: 3     CHOLECALCIFEROL, VITAMIN D3, (VITAMIN D3 ORAL), Take 1 capsule by mouth daily. 5000, Disp: , Rfl:      cyclobenzaprine (FLEXERIL) 10 MG tablet, Take 1 tablet (10 mg total) by mouth 2 (two) times a day as needed for muscle spasms., Disp: 30 tablet, Rfl: 1     ibuprofen (ADVIL,MOTRIN) 800 MG tablet, Take 800 mg by mouth every 6 (six) hours as needed for pain., Disp: , Rfl:      montelukast (SINGULAIR) 10 mg tablet, TAKE ONE TABLET BY MOUTH ONE TIME DAILY, Disp: 90 tablet, Rfl: 3     HYDROcodone-acetaminophen 5-325 mg per tablet, Take 1 tablet by mouth every 6 (six) hours as needed for pain., Disp: 20 tablet, Rfl: 0     ondansetron (ZOFRAN-ODT) 4 MG disintegrating tablet, Take 1 tablet (4 mg total) by mouth every 6 (six) hours as needed for nausea., Disp: 15 tablet, Rfl: 0    Allergies:  Allergies   Allergen Reactions     Penicillins Hives     Prednisone      Other: Redness and extreme irritability         ROS:  Pertinent positives as noted in HPI; otherwise 12 point ROS negative.      Physical Exam:  EXAM:  BP (!) 84/48 (Patient Site: Left Arm, Patient Position: Sitting, Cuff Size: Adult Large)  Pulse 90  Temp 98.3  F (36.8  C) (Oral)   Resp 14  Ht 5' 4.5\" (1.638 m)  Wt 204 lb 14.4 oz (92.9 kg)  SpO2 98%  BMI 34.63 kg/m2   Gen:  NAD, appears well, well-hydrated  HEENT:  TMs nl, oropharynx benign, nasal mucosa nl, conjunctiva clear  Neck:  Supple, no adenopathy, no thyromegaly, no carotid bruits, no JVD  Lungs:  Clear to auscultation bilaterally  Cor:  RRR no murmur  Abd:  Soft, tender to palpation at RUQ and right flank,  BS+, no masses, no guarding or rebound, no HSM  Extr:  Neg.  Neuro:  No asymmetry  Skin:  Warm/dry        Results:  Results for orders placed or performed in visit on 08/16/17   Culture, Urine   Result Value Ref Range    Culture No Growth    Comprehensive Metabolic Panel   Result Value Ref Range    " Sodium 141 136 - 145 mmol/L    Potassium 4.3 3.5 - 5.0 mmol/L    Chloride 109 (H) 98 - 107 mmol/L    CO2 24 22 - 31 mmol/L    Anion Gap, Calculation 8 5 - 18 mmol/L    Glucose 90 70 - 125 mg/dL    BUN 13 8 - 22 mg/dL    Creatinine 0.66 0.60 - 1.10 mg/dL    GFR MDRD Af Amer >60 >60 mL/min/1.73m2    GFR MDRD Non Af Amer >60 >60 mL/min/1.73m2    Bilirubin, Total 0.4 0.0 - 1.0 mg/dL    Calcium 8.6 8.5 - 10.5 mg/dL    Protein, Total 6.6 6.0 - 8.0 g/dL    Albumin 3.6 3.5 - 5.0 g/dL    Alkaline Phosphatase 74 45 - 120 U/L    AST 18 0 - 40 U/L    ALT 14 0 - 45 U/L   Urinalysis   Result Value Ref Range    Color, UA Yellow Colorless, Yellow, Straw, Light Yellow    Clarity, UA Clear Clear    Glucose, UA Negative Negative    Bilirubin, UA Negative Negative    Ketones, UA Negative Negative    Specific Gravity, UA 1.025 1.005 - 1.030    Blood, UA Trace (!) Negative    pH, UA 6.0 5.0 - 8.0    Protein, UA Negative Negative mg/dL    Urobilinogen, UA 0.2 E.U./dL 0.2 E.U./dL, 1.0 E.U./dL    Nitrite, UA Negative Negative    Leukocytes, UA Negative Negative   HM1 (CBC with Diff)   Result Value Ref Range    WBC 5.1 4.0 - 11.0 thou/uL    RBC 4.07 3.80 - 5.40 mill/uL    Hemoglobin 13.2 12.0 - 16.0 g/dL    Hematocrit 38.9 35.0 - 47.0 %    MCV 96 80 - 100 fL    MCH 32.5 27.0 - 34.0 pg    MCHC 34.0 32.0 - 36.0 g/dL    RDW 10.3 (L) 11.0 - 14.5 %    Platelets 301 140 - 440 thou/uL    MPV 7.0 7.0 - 10.0 fL    Neutrophils % 53 50 - 70 %    Lymphocytes % 35 20 - 40 %    Monocytes % 8 2 - 10 %    Eosinophils % 3 0 - 6 %    Basophils % 1 0 - 2 %    Neutrophils Absolute 2.7 2.0 - 7.7 thou/uL    Lymphocytes Absolute 1.8 0.8 - 4.4 thou/uL    Monocytes Absolute 0.4 0.0 - 0.9 thou/uL    Eosinophils Absolute 0.2 0.0 - 0.4 thou/uL    Basophils Absolute 0.0 0.0 - 0.2 thou/uL

## 2021-06-13 NOTE — PROGRESS NOTES
Chief Complaint   Patient presents with     Sore Throat     half the fmaily has strep         History of Present Illness: Nursing notes reviewed. Patient has felt ill for about a month with respiratory symptoms.  She started getting a sore throat especially over the past 2 days. She has less chest tightness and coughing with treatment given at last exam. She has felt nasally congested the past month, but worse the past 4-5 days.    Review of systems: See history of present illness, otherwise negative.     Current Outpatient Prescriptions   Medication Sig Dispense Refill     albuterol (PROAIR HFA;PROVENTIL HFA;VENTOLIN HFA) 90 mcg/actuation inhaler Inhale 2 puffs every 6 (six) hours as needed for wheezing. 18 g 11     azelastine (ASTELIN) 137 mcg (0.1 %) nasal spray 2 sprays into each nostril 2 (two) times a day. Use in each nostril as directed 30 mL 9     beclomethasone (QVAR) 80 mcg/actuation inhaler Inhale 2 puffs 2 (two) times a day. 1 Inhaler 12     cetirizine (ZYRTEC) 10 MG tablet Take 1 tablet (10 mg total) by mouth daily. 90 tablet 3     CHOLECALCIFEROL, VITAMIN D3, (VITAMIN D3 ORAL) Take 1 capsule by mouth daily. 5000       cyclobenzaprine (FLEXERIL) 10 MG tablet Take 1 tablet (10 mg total) by mouth 2 (two) times a day as needed for muscle spasms. 30 tablet 1     HYDROcodone-acetaminophen 5-325 mg per tablet Take 1 tablet by mouth every 6 (six) hours as needed for pain. 20 tablet 0     ibuprofen (ADVIL,MOTRIN) 800 MG tablet Take 800 mg by mouth every 6 (six) hours as needed for pain.       inhalational spacing device (AEROCHAMBER MV) Spcr Use with albuterol inhaler 1 each 0     montelukast (SINGULAIR) 10 mg tablet TAKE ONE TABLET BY MOUTH ONE TIME DAILY 90 tablet 3     ondansetron (ZOFRAN-ODT) 4 MG disintegrating tablet Take 1 tablet (4 mg total) by mouth every 6 (six) hours as needed for nausea. 15 tablet 0     cefdinir (OMNICEF) 300 MG capsule Take 1 capsule (300 mg total) by mouth 2 (two) times a day for  10 days. 20 capsule 0     No current facility-administered medications for this visit.        Past Medical History:   Diagnosis Date     Abnormal Pap smear of cervix     hysterectomy, needs vaginal pap every 5 yrs     Ovarian cyst     Right      Past Surgical History:   Procedure Laterality Date     CYSTECTOMY Right     several     HYSTERECTOMY  2005     LAPAROSCOPIC VAGINAL HYSTERECTOMY  3/2005    for cervical cancer, needs vaginal pap every 5 yrs     TONSILLECTOMY  1992     TUBAL LIGATION  2002      Social History     Social History     Marital status:      Spouse name: N/A     Number of children: N/A     Years of education: N/A     Social History Main Topics     Smoking status: Never Smoker     Smokeless tobacco: Never Used     Alcohol use Yes      Comment: rarely     Drug use: No     Sexual activity: Yes     Partners: Male     Birth control/ protection: None      Comment: hysterectomy     Other Topics Concern     Not on file     Social History Narrative       History   Smoking Status     Never Smoker   Smokeless Tobacco     Never Used      Exam:   Blood pressure 132/64, pulse 86, temperature 98.3  F (36.8  C), temperature source Other, weight 209 lb 8 oz (95 kg), SpO2 100 %, not currently breastfeeding.    EXAM:   General: Vital signs reviewed. Patient is in no acute appearing distress except for occasional cough. Breathing is non labored appearing. Patient is alert and oriented x 3.   ENT: Ear exam shows clear TMs with no injection, nasal turbinates are injected and edematous with tender right maxillary sinuses, no pharyngeal injection with increased post nasal drainage noted.  Neck: supple with no adenopathy.  Heart: Normal rate and rhythm without murmur  Lungs: Clear to auscultation with good air flow bilaterally.  Skin: warm and dry  Recent Results (from the past 24 hour(s))   Rapid Strep A Screen-Throat   Result Value Ref Range    Rapid Strep A Antigen No Group A Strep detected, presumptive negative  No Group A Strep detected, presumptive negative    Results from exam reviewed with patient.    Assessment/Plan   1. Sore throat  Rapid Strep A Screen-Throat    Group A Strep, RNA Direct Detection, Throat   2. Sinusitis  cefdinir (OMNICEF) 300 MG capsule       There are no Patient Instructions on file for this visit.   Artie Koch DO

## 2021-06-13 NOTE — PROGRESS NOTES
Assessment/Plan:   Urinary tract infection   Dysuria and Vaginal irritation  4 days of vaginal irritation and mild dysuria off and on.  Today increased urinary urgency and frequency, cloudy urine, trouble emptying. Still very irritated swollen vaginal area and some itch but no discharge or odor.  UA suggests UTI, wet prep negative. Prone to yeast infections with antibiotics.   - Urinalysis-UC if Indicated  - Culture, Urine  - Wet Prep, Vaginal  - fluconazole (DIFLUCAN) 150 MG tablet; Take 1 tablet (150 mg total) by mouth once for 1 dose. Repeat dose after antibiotics  Dispense: 2 tablet; Refill: 0  - nitrofurantoin, macrocrystal-monohydrate, (MACROBID) 100 MG capsule; Take 1 capsule (100 mg total) by mouth 2 (two) times a day for 7 days.  Dispense: 14 capsule; Refill: 0    Lots of water to drink  Sitz bath  Diflucan once now and once after antibiotic  Nitrofurantoin twice a day for 7 days  Follow up as needed  Urine culture pending, will adjust if needed.       Subjective:      Alanna Pederson is a 37 y.o. female who presents with dysuria and vaginal irritation.  About 4 days ago she developed some vaginal irritation and itching.  She was also having some dysuria off and on.  No vaginal discharge, no odor.  She has been having increased urge to void and urgency, feeling unable to empty completely.  Today the urine is cloudy.  The vagina also feels more swollen and irritated.  She has started working out recently and once last week didn't change right away since she had errands to run.  She also has started using a new body wash and did use the hot tub at the gym once last week.  Work out clothes also are spandex type but she usually changes out of them right away after the workout.  No new sexual partners.  No abnormal vaginal bleeding. No apparent lesions or sores in the perineal area.  No fever, no flank or abdominal pain.  No N/V/D or constipation.      Allergies   Allergen Reactions     Penicillins Hives      Prednisone      Other: Redness and extreme irritability       Current Outpatient Prescriptions on File Prior to Visit   Medication Sig Dispense Refill     azelastine (ASTELIN) 137 mcg (0.1 %) nasal spray 2 sprays into each nostril 2 (two) times a day. Use in each nostril as directed 30 mL 9     cetirizine (ZYRTEC) 10 MG tablet Take 1 tablet (10 mg total) by mouth daily. 90 tablet 3     CHOLECALCIFEROL, VITAMIN D3, (VITAMIN D3 ORAL) Take 1 capsule by mouth daily. 5000       cyclobenzaprine (FLEXERIL) 10 MG tablet Take 1 tablet (10 mg total) by mouth 2 (two) times a day as needed for muscle spasms. 30 tablet 1     ibuprofen (ADVIL,MOTRIN) 800 MG tablet Take 800 mg by mouth every 6 (six) hours as needed for pain.       montelukast (SINGULAIR) 10 mg tablet TAKE ONE TABLET BY MOUTH ONE TIME DAILY 90 tablet 3     HYDROcodone-acetaminophen 5-325 mg per tablet Take 1 tablet by mouth every 6 (six) hours as needed for pain. 20 tablet 0     ondansetron (ZOFRAN-ODT) 4 MG disintegrating tablet Take 1 tablet (4 mg total) by mouth every 6 (six) hours as needed for nausea. 15 tablet 0     No current facility-administered medications on file prior to visit.      Patient Active Problem List   Diagnosis     Acne Vulgaris     Overweight     Migraine Headache     Vitamin D Deficiency     Generalized Osteoarthritis Of Multiple Sites     Joint Pain, Localized In The Wrist     Fibromyalgia     Carpal Tunnel Syndrome     Allergic Rhinitis     Lump In / On The Skin     Tingling (Paresthesia)     Abdominal Pain     Exercise-induced Asthma     Allergic Rhinitis Due To Ragweed Pollen     Allergic Rhinitis Due To Cats     Allergic Rhinitis Due To Mold     Postconcussion syndrome     Cervical strain     Concussion       Objective:     BP 98/60 (Patient Site: Right Arm, Patient Position: Sitting, Cuff Size: Adult Regular)  Pulse 94  Temp 98.2  F (36.8  C) (Oral)   Resp 16  Wt 206 lb 5 oz (93.6 kg)  SpO2 97%  BMI 34.87  kg/m2    Physical  General Appearance: Alert, cooperative, no distress  Head: Normocephalic, without obvious abnormality, atraumatic  Eyes: Conjunctivae are normal.  Lungs: Clear to auscultation bilaterally, respirations unlabored  Heart: Regular rate and rhythm  Abdomen: Soft, non-tender, no CVA tenderness on percussion.  Normal female external genitalia.  Mild redness and swelling diffusely, no lesions, no abnormal odor or discharge.   Skin: no rashes or lesions  Psychiatric: Patient has a normal mood and affect.      Recent Results (from the past 48 hour(s))   Urinalysis-UC if Indicated   Result Value Ref Range    Color, UA Yellow Colorless, Yellow, Straw, Light Yellow    Clarity, UA Clear Clear    Glucose, UA Negative Negative    Bilirubin, UA Negative Negative    Ketones, UA Negative Negative    Specific Gravity, UA 1.020 1.005 - 1.030    Blood, UA Moderate (!) Negative    pH, UA 7.0 5.0 - 8.0    Protein, UA 30 mg/dL (!) Negative mg/dL    Urobilinogen, UA 0.2 E.U./dL 0.2 E.U./dL, 1.0 E.U./dL    Nitrite, UA Negative Negative    Leukocytes, UA Large (!) Negative    Bacteria, UA Few (!) None Seen hpf    RBC, UA 10-25 (!) None Seen, 0-2 hpf    WBC, UA  (!) None Seen, 0-5 hpf    Squam Epithel, UA 0-5 None Seen, 0-5 lpf   Wet Prep, Vaginal   Result Value Ref Range    Yeast Result No yeast seen No yeast seen    Trichomonas No Trichomonas seen No Trichomonas seen    Clue Cells, Wet Prep No Clue cells seen No Clue cells seen
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
## 2021-06-14 NOTE — PROGRESS NOTES
Assessment:   The encounter diagnosis was Common cold.     Plan:   No medications were ordered this encounter    Patient Instructions     The symptoms you describe suggest a viral cause, which is much more common than a bacterial cause. Antibiotics will treat bacterial infections, but have no effect on viral infections. Symptom care is appropropriate for the first 7-10 days. If your symptoms are not improving over the next 3-4 days, either submit another E visit request, or come to one of our Walk In Clinics or schedule an appointment to be seen at your primary clinic.     You will not be charged for this E visit request    No Follow-up on file.    Subjective:   Alanna Pederson is a 37 y.o. female who submitted an eVisit request for evaluation of her Sinus Problem.  See the questionnaire and message section of encounter report for information related to history of present illness and review of systems.    The following portions of the patient's history were reviewed and updated as appropriate:  She  does not have any pertinent problems on file.  She is allergic to penicillins and prednisone..     Objective:   No exam performed today, patient submitted as eVisit.
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
## 2021-06-15 NOTE — PATIENT INSTRUCTIONS - HE
Dear Alanna Pederson,    Your symptoms show that you may have coronavirus (COVID-19). This illness can cause fever, cough and trouble breathing. Many people get a mild case and get better on their own. Some people can get very sick.    I'm not sure what your rash is, but if it is spreading or becomes painful or very itchy, I would be seen in one of our urgent cares.      Because you also reported sore throat I would like to also test you for Strep Throat to determine if we need to treat you for that as well.    What should I do?  We would like to test you for Covid-19 virus and Strep Throat. I have placed orders for these tests.   To schedule: go to your [facility] home page and scroll down to the section that says  You have an appointment that needs to be scheduled  and click the large green button that says  Schedule Now  and follow the steps to find the next available openings.  It is important that when you are asked what the reason for your appointment is that you mention you need BOTH Covid and Strep tests.     If you are unable to complete these [facility] scheduling steps, please call [phone] to schedule your testing.     Return to work/school/ guidance:   Please let your workplace manager and staffing office know when your quarantine ends     We can t give you an exact date as it depends on the above. You can calculate this on your own or work with your manager/staffing office to calculate this. (For example if you were exposed on 10/4, you would have to quarantine for 14 full days. That would be through 10/18. You could return on 10/19.)      If you receive a positive COVID-19 test result, follow the guidance of the those who are giving you the results. Usually the return to work is 10 (or in some cases 20 days from symptom onset.) If you work at [employer], you must also be cleared by Employee Occupational Health and Safety to return to work.        If you receive a negative COVID-19 test  result and did not have a high risk exposure to someone with a known positive COVID-19 test, you can return to work once you're free of fever for 24 hours without fever-reducing medication and your symptoms are improving or resolved.      If you receive a negative COVID-19 test and If you had a high risk exposure to someone who has tested positive for COVID-19 then you can return to work 14 days after your last contact with the positive individual    Note: If you have ongoing exposure to the covid positive person, this quarantine period may be more than 14 days. (For example, if you are continued to be exposed to your child who tested positive and cannot isolate from them, then the quarantine of 7-14 days can't start until your child is no longer contagious. This is typically 10 days from onset of the child's symptoms. So the total duration may be 17-24 days in this case.)    Sign up for [employer].   We know it's scary to hear that you might have COVID-19. We want to track your symptoms to make sure you're okay over the next 2 weeks. Please look for an email from [employer]--this is a free, online program that we'll use to keep in touch. To sign up, follow the link in the email you will receive. Learn more at [email or web address]    How can I take care of myself?    Get lots of rest. Drink extra fluids (unless a doctor has told you not to)    Take Tylenol (acetaminophen) or ibuprofen for fever or pain. If you have liver or kidney problems, ask your family doctor if it's okay to take Tylenol o ibuprofen    If you have other health problems (like cancer, heart failure, an organ transplant or severe kidney disease): Call your specialty clinic if you don't feel better in the next 2 days.    Know when to call 911. Emergency warning signs include:  o Trouble breathing or shortness of breath  o Pain or pressure in the chest that doesn't go away  o Feeling confused like you haven't felt before, or not  being able to wake up  o Bluish-colored lips or face    Where can I get more information?  [facility] - About COVID-19:   [email or web address]    CDC - What to Do If You're Sick:   www.cdc.gov/coronavirus/2019-ncov/about/steps-when-sick.html      February 22, 2021  RE:  Alanna Pederson                                                                                                                  [address]  [place] [address]      To whom it may concern:    I evaluated Alanna Pederson on 02/22/21. Alanna Pederson should be excused from work/school.     They should let their workplace manager and staffing office know when their quarantine ends.    We can not give an exact date as it depends on the information below. They can calculate this on their own or work with their manager/staffing office to calculate this. (For example if they were exposed on 10/04, they would have to quarantine for 14 full days. That would be through 10/18. They could return on 10/19.)    Quarantine Guidelines:      If patient receives a positive COVID-19 test result, they should follow the guidance of those who are giving the results. Usually the return to work is 10 (or in some cases 20 days from symptom onset.) If they work at [facility], they must be cleared by Employee Occupational Health and Safety to return to work.        If patient receives a negative COVID-19 test result and did not have a high risk exposure to someone with a known positive COVID-19 test, they can return to work once they're free of fever for 24 hours without fever-reducing medication and their symptoms are improving or resolved.      If patient receives a negative COVID-19 test and if they had a high risk exposure to someone who has tested positive for COVID-19 then they can return to work 14 days after their last contact with the positive individual    Note: If there is ongoing exposure to the covid positive person, this  quarantine period may be longer than 14 days. (For example, if they are continually exposed to their child, who tested positive and cannot isolate from them, then the quarantine of 7-14 days can't start until their child is no longer contagious. This is typically 10 days from onset to the child's symptoms. So the total duration may be 17-24 days in this case.)    Sincerely,  Jinny Leyva, CNP

## 2021-06-16 PROBLEM — M21.611 BUNION, RIGHT: Status: ACTIVE | Noted: 2019-01-09

## 2021-06-16 PROBLEM — Z90.710 S/P HYSTERECTOMY: Status: ACTIVE | Noted: 2017-10-09

## 2021-06-16 PROBLEM — Z80.3 FAMILY HISTORY OF MALIGNANT NEOPLASM OF BREAST: Status: ACTIVE | Noted: 2018-09-25

## 2021-06-16 PROBLEM — Z98.890 S/P BUNIONECTOMY: Status: ACTIVE | Noted: 2019-12-09

## 2021-06-16 PROBLEM — I73.00 RAYNAUD'S SYNDROME: Status: ACTIVE | Noted: 2018-09-25

## 2021-06-16 PROBLEM — R92.30 DENSE BREAST TISSUE ON MAMMOGRAM: Status: ACTIVE | Noted: 2018-09-25

## 2021-06-16 NOTE — PATIENT INSTRUCTIONS - HE
Dulera 200/5 2 puffs twice daily as needed in yellow zone of asthma action plan(cough, wheeze, shortness of breath)    Okay to double up on Zyrtec/Claritin    Montelukast

## 2021-06-16 NOTE — TELEPHONE ENCOUNTER
Central PA team  [phone]  Pool: HE PA MED ([id number])          PA has been initiated.       PA form completed and faxed insurance via Cover My Meds     Chaves:  BIA BENSON (Chaves: [id number])     Medication:  levalbuterol (XOPENEX HFA) 45 mcg/actuation inhaler    Insurance:  [employer]        Response will be received via fax and may take up to 5-10 business days depending on plan

## 2021-06-16 NOTE — PROGRESS NOTES
Alanna Pederson is a 40 y.o. female who is being evaluated via a billable video visit.      How would you like to obtain your AVS? MyChart.  If dropped from the video visit, the video invitation should be resent by: Text to cell phone: [phone]  Will anyone else be joining your video visit? No      Video Start Time: 906      Subjective   Alanna Pederson is 40 y.o. and presents today for the following health issues   HPI   Chief complaint: Follow-up asthma and allergies    History of present illness: This is a pleasant 40-year-old woman I saw approximately a year ago for asthma and allergies.  We had talked about doing Dulera seasonally.  She only use it during the spring.  Otherwise she said no cough, wheeze or shortness of breath.  She is not had to use her Xopenex inhaler.  Illnesses or allergies trigger her symptoms.  She continues on montelukast and alternates Zyrtec with Claritin.  She notes that that does help but does not completely alleviate symptoms.  She is tried nasal sprays in the past that have been ineffective.              Objective       Vitals:  No vitals were obtained today due to virtual visit.            Impression report and plan:  1.  Allergic rhinitis  2.  Intermittent asthma    She can try to remain off Dulera.  I will send her in a prescription to use as needed in the yellow zone of her asthma action plan.  Continue montelukast.  Okay to double up on antihistamine if needed.  Follow yearly or if she is using Xopenex or oral controller medication regularly.    Video-Visit Details    Type of service:  Video Visit    Video End Time (time video stopped): 911  Originating Location (pt. Location): home  Distant Location (provider location):  [facility]     Platform used for Video Visit: [email or web address]

## 2021-06-17 NOTE — PROGRESS NOTES
Chief complaint: Follow-up allergies and asthma    History of present illness: This is a pleasant 37-year-old woman who is here today for follow-up of her allergies and asthma.  She states that she has had a terrible winter.  She has had repeated sinus infections.  She has had multiple rounds of antibiotics which improved symptoms temporarily.  She feels a lot of pressure in her face and a lot of drainage on the back of her throat.  She uses nasal rinses twice per week.  In the past she has used steroid nasal sprays which have been ineffective.  She is using Astelin currently but feels that it dried out her sinuses.  She does use Zyrtec as needed with feels is very drying as well.  She has not seen ENT or had a CT scan of her sinuses.  She notes her asthma has been more active as well.  She states she feels a lot of chest tightness and breathlessness.  This will happen especially when she laughs or goes up stairs.  She feels difficulty getting a deep breath and some tightening.  She states she does not wake up from sleep with the symptoms.  She states albuterol does not always relieve the symptoms.  She also takes montelukast regularly.  She notes this makes a great deal of difference in her symptoms.    Past medical history, social history, family medical history, meds and allergies reviewed and updated accordingly.    Review of Systems performed as above and the remainder is negative.      Current Outpatient Prescriptions:      albuterol (PROAIR HFA;PROVENTIL HFA;VENTOLIN HFA) 90 mcg/actuation inhaler, Inhale 2 puffs every 6 (six) hours as needed for wheezing., Disp: 18 g, Rfl: 11     azelastine (ASTELIN) 137 mcg (0.1 %) nasal spray, 2 sprays into each nostril 2 (two) times a day. Use in each nostril as directed, Disp: 30 mL, Rfl: 9     cetirizine (ZYRTEC) 10 MG tablet, Take 1 tablet (10 mg total) by mouth daily., Disp: 90 tablet, Rfl: 3     CHOLECALCIFEROL, VITAMIN D3, (VITAMIN D3 ORAL), Take 1 capsule by mouth  daily. 5000, Disp: , Rfl:      cyclobenzaprine (FLEXERIL) 10 MG tablet, Take 1 tablet (10 mg total) by mouth 2 (two) times a day as needed for muscle spasms., Disp: 30 tablet, Rfl: 1     montelukast (SINGULAIR) 10 mg tablet, TAKE ONE TABLET BY MOUTH ONE TIME DAILY, Disp: 90 tablet, Rfl: 3     ibuprofen (ADVIL,MOTRIN) 800 MG tablet, Take 800 mg by mouth every 6 (six) hours as needed for pain., Disp: , Rfl:      loratadine (CLARITIN) 10 mg tablet, Take 1 tablet (10 mg total) by mouth daily., Disp: 90 tablet, Rfl: 3     mometasone-formoterol (DULERA) 100-5 mcg/actuation HFAA inhaler, Inhale 2 puffs 2 (two) times a day., Disp: 1 Inhaler, Rfl: 1    Allergies   Allergen Reactions     Penicillins Hives     Prednisone      Other: Redness and extreme irritability         /68  Pulse 80  SpO2 97%  Gen: Pleasant female not in acute distress  HEENT: Eyes no erythema of the bulbar or palpebral conjunctiva, no edema. Ears: TMs well visualized, no effusions. Nose: No congestion, mucosa normal. Mouth: Throat clear, no lip or tongue edema.   Cardiac: Regular rate and rhythm, no murmurs, rubs or gallops  Respiratory: Clear to auscultation bilaterally, no adventitious breath sounds  Lymph: No supraclavicular or cervical lymphadenopathy  Skin: No rashes or lesions  Psych: Alert and oriented times 3    FENO: 14    Spirometry was performed.  FEV1 to FVC ratio 83%.  FEV1 2.78 L or 79% predicted.  FVC 3.36 L or 77% of predicted.    Impression report and plan:  1.  Mild persistent asthma  2.  Allergic rhinitis  3.  Recurrent sinusitis  4.  Vocal cord dysfunction    I would like patient to start Dulera 2 puffs twice daily.  AeroChamber provided and technique reviewed.  She should try this for at least one month.  If symptoms are not better, I would recommend full PFT and perhaps methacholine challenge.  I think she has a component of vocal cord dysfunction as well.  I reviewed some exercises with her.  Given her sinus symptoms, I  recommend a CT scan of the sinus when her symptoms are at their height.  I would recommend daily nasal rinses and we could add budesonide to the rinses if needed.  I renewed her montelukast today.  Switch from Zyrtec to Claritin.

## 2021-06-17 NOTE — TELEPHONE ENCOUNTER
Telephone Encounter by Marisol Trejo at 4/12/2021 12:16 PM     Author: Marisol Trejo Service: -- Author Type: Patient Access    Filed: 4/12/2021 12:17 PM Encounter Date: 4/5/2021 Status: Signed    : Marisol Trejo (Patient Access)       PA APPROVED:    Approval start date: 02/08/2021  Approval end date:  04/07/2022    Pharmacy has been notified of approval and will contact patient when medication is ready for pickup.

## 2021-06-20 NOTE — PROGRESS NOTES
Assessment:      Healthy female exam.    1. Routine general medical examination at a health care facility  Comprehensive Metabolic Panel    Lipid Profile    HM1(CBC and Differential)    Thyroid Stimulating Hormone (TSH)    Vitamin D, Total (25-Hydroxy)    HM1 (CBC with Diff)    CANCELED: Wet Prep, Vaginal    CANCELED: Chlamydia trachomatis & Neisseria gonorrhoeae, Amplified Detection   2. Dense breast tissue on mammogram  Mammo Screening Bilateral   3. Family history of malignant neoplasm of breast  Mammo Screening Bilateral   4. Raynaud's syndrome     5. Fibromyalgia  tiZANidine (ZANAFLEX) 4 MG tablet   6. Migraine  tiZANidine (ZANAFLEX) 4 MG tablet   7. S/P hysterectomy  CANCELED: Gynecologic Cytology (PAP Smear)          Plan:      This is a 37 yo female here for physical exam  1.  Health maintenance - due for breast cancer screening - has extremely dense breasts - will try to schedule 3D mammogram.    Will also check labs.  2.  Raynauds syndrome - stable  3.  Fibromyalgia, migraines - uses Tizanidine for muscle relaxer as needed; will refill today  4.  S/p hysterectomy - no need for pap smear today.    Subjective:      Alanna Pederson is a 38 y.o. female who presents for an annual exam.  The patient reports that there is not domestic violence in her life.     Here for physical exam   No specific concerns today    Healthy Habits:   Regular Exercise: Yes  Sunscreen Use: Yes  Healthy Diet: Yes  Dental Visits Regularly: Yes  Seat Belt: Yes  Sexually active: Yes  Self Breast Exam Monthly:not regularly - dense breasts - has trouble distinguishing new spots        Immunization History   Administered Date(s) Administered     DT (pediatric) 01/01/1999     Hep A, historic 03/01/2007, 09/12/2007     Hep B, historic 01/01/1900, 01/01/1996, 02/01/1996, 06/01/1996     Influenza, inj, historic,unspecified 11/04/2008     Influenza, seasonal,quad inj 6-35 mos 10/19/2012     Influenza,seasonal quad, PF 10/09/2013     MMR  1900, 02/15/2001     Rubella 1900, 2001     Td,adult,historic,unspecified 1990, 2008     Tdap 2007, 2014     Immunization status: reviewed .    No exam data present    Gynecologic History  No LMP recorded. Patient has had a hysterectomy.  Contraception: none  Last Pap: . Results were: normal  Last mammogram: 2017. Results were: normal      OB History    Para Term  AB Living   2 2 2   2   SAB TAB Ectopic Multiple Live Births       2      # Outcome Date GA Lbr Partha/2nd Weight Sex Delivery Anes PTL Lv   2 Term 10/11/98 40w0d  8 lb (3.629 kg) M Vag-Spont None N LAURA   1 Term 95 40w0d  7 lb 1 oz (3.204 kg) F Vag-Spont EPI N LAURA          Current Outpatient Prescriptions   Medication Sig Dispense Refill     loratadine (CLARITIN) 10 mg tablet Take 1 tablet (10 mg total) by mouth daily. 90 tablet 3     albuterol (PROAIR HFA;PROVENTIL HFA;VENTOLIN HFA) 90 mcg/actuation inhaler Inhale 2 puffs every 6 (six) hours as needed for wheezing. 18 g 11     azelastine (ASTELIN) 137 mcg (0.1 %) nasal spray 2 sprays into each nostril 2 (two) times a day. Use in each nostril as directed 30 mL 9     cetirizine (ZYRTEC) 10 MG tablet Take 1 tablet (10 mg total) by mouth daily. 90 tablet 3     CHOLECALCIFEROL, VITAMIN D3, (VITAMIN D3 ORAL) Take 1 capsule by mouth daily. 5000       ibuprofen (ADVIL,MOTRIN) 800 MG tablet Take 800 mg by mouth every 6 (six) hours as needed for pain.       mometasone-formoterol (DULERA) 100-5 mcg/actuation HFAA inhaler Inhale 2 puffs 2 (two) times a day. 1 Inhaler 1     montelukast (SINGULAIR) 10 mg tablet TAKE ONE TABLET BY MOUTH ONE TIME DAILY 90 tablet 3     tiZANidine (ZANAFLEX) 4 MG tablet Take 1 tablet (4 mg total) by mouth at bedtime as needed. 30 tablet 3     No current facility-administered medications for this visit.      Past Medical History:   Diagnosis Date     Abnormal Pap smear of cervix     hysterectomy, needs vaginal pap every 5 yrs      Ovarian cyst     Right     Past Surgical History:   Procedure Laterality Date     CYSTECTOMY Right     several     HYSTERECTOMY  2005     LAPAROSCOPIC VAGINAL HYSTERECTOMY  3/2005    for cervical cancer, needs vaginal pap every 5 yrs     TONSILLECTOMY  1992     TUBAL LIGATION  2002     Naproxen; Penicillins; and Prednisone  Family History   Problem Relation Age of Onset     Breast cancer Paternal Aunt 30     Heart disease Paternal Aunt      Breast cancer Paternal Grandmother      Heart disease Paternal Grandmother      Diabetes Paternal Grandmother      Stroke Paternal Grandmother      Cervical cancer Mother      Hashimoto's thyroiditis Mother      Heart attack Father 40     Heart attack Paternal Grandfather      Diabetes Maternal Grandmother      Stroke Maternal Grandmother      Bipolar disorder Daughter      ADD / ADHD Daughter      Uterine cancer Paternal Aunt      Social History     Social History     Marital status:      Spouse name: N/A     Number of children: N/A     Years of education: N/A     Occupational History     Not on file.     Social History Main Topics     Smoking status: Never Smoker     Smokeless tobacco: Never Used     Alcohol use Yes      Comment: rarely     Drug use: No     Sexual activity: Yes     Partners: Male     Birth control/ protection: None      Comment: hysterectomy     Other Topics Concern     Not on file     Social History Narrative       Review of Systems  Review of Systems    fam hx breast cancer (pat aunt has had it twice, PGM, pat aunt with uterine cancer, mom with cervical cancer) - has dense breast tissue  Wants 3D mammogram  Has cysts in breast in past - doesn't even do self breast exam  Patient with hysterectomy for pap KENDRA-2 :  Age 22    Sees Dr. Fernandez at [facility] - severe allergy - current good regimen    Uses muscle relaxer - especially with changes in weather  Muscle tightness creates headaches/pain      Objective:         Vitals:    09/25/18 0749   BP: (!) 88/60  "  Pulse: 84   Resp: 20   Temp: 98.1  F (36.7  C)   TempSrc: Oral   Weight: 206 lb 1 oz (93.5 kg)   Height: 5' 4.5\" (1.638 m)     Body mass index is 34.82 kg/(m^2).    Physical  Physical Exam    EXAM:  BP (!) 88/60 (Patient Site: Left Arm, Patient Position: Sitting, Cuff Size: Adult Large)  Pulse 84  Temp 98.1  F (36.7  C) (Oral)   Resp 20  Ht 5' 4.5\" (1.638 m)  Wt 206 lb 1 oz (93.5 kg)  Breastfeeding? No  BMI 34.82 kg/m2   Gen:  NAD, appears well, well-hydrated  HEENT:  TMs nl, oropharynx benign, nasal mucosa nl, conjunctiva clear  Neck:  Supple, no adenopathy, no thyromegaly, no carotid bruits, no JVD  Lungs:  Clear to auscultation bilaterally  Breast exam:  No breast lumps, no skin changes, no nipple discharge, no axillary adenopathy  Cor:  RRR no murmur  Abd:  Soft, nontender, BS+, no masses, no guarding or rebound, no HSM  Extr:  Neg.  Neuro:  No asymmetry, Nl motor tone/strength, nl sensation, reflexes =, gait nl, nl coordination, CN intact,   Skin:  Warm/dry        "

## 2021-06-22 NOTE — PROGRESS NOTES
"Preoperative Exam    Scheduled Procedure: Bunionectomy   Surgery Date:  01/17/19  Surgery Location: [facility]    Surgeon:  Dr. Familia Mulligan     Assessment/Plan:     1. Preop examination  Hemoglobin   2. Bunion, right       This is a 39 yo female here for preop assessment - she is scheduled for bunionectomy on right foot on 1/17/19.  She is generally medically stable.  Patient has had hysterectomy, so no pregnancy test needed. Hemoglobin is normal.  Patient is low risk (there is some family history of \"clots\", but no clear family disorder).  OK for surgery.     Surgical Procedure Risk: Low (reported cardiac risk generally < 1%)  Have you had prior anesthesia?: Yes  Have you or any family members had a previous anesthesia reaction:  No  Do you or any family members have a history of a clotting or bleeding disorder?: Yes: mom had post op DVT; both grandmothers had some h/o \"clots\"; paternal aunt with blood clot  Cardiac Risk Assessment: no increased risk for major cardiac complications    Patient approved for surgery with general or local anesthesia.    Please Note:  no special consideration    Functional Status: Independent  Patient plans to recover at home with family.     Subjective:      Alanna Pederson is a 38 y.o. female who presents for a preoperative consultation.      Has pain in foot - right foot  Also has pain in left - due to bunion (right is much worse)  [place] to [place]  Works on her feet mostly - having trouble getting through the day    Had bleeding after tonsillectomy - needed cauterization  No hormones x at least 10 years (had hysterectomy age 22)  All other systems reviewed and are negative, other than those listed in the HPI.    Pertinent History  Do you have difficulty breathing or chest pain after walking up a flight of stairs: No  History of obstructive sleep apnea: No  Steroid use in the last 6 months: No  Frequent Aspirin/NSAID use: " No  Prior Blood Transfusion: No  Prior Blood Transfusion Reaction: No  If for some reason prior to, during or after the procedure, if it is medically indicated, would you be willing to have a blood transfusion?:  There is no transfusion refusal.    Current Outpatient Medications   Medication Sig Dispense Refill     albuterol (PROAIR HFA;PROVENTIL HFA;VENTOLIN HFA) 90 mcg/actuation inhaler Inhale 2 puffs every 6 (six) hours as needed for wheezing. 18 g 11     azelastine (ASTELIN) 137 mcg (0.1 %) nasal spray 2 sprays into each nostril 2 (two) times a day. Use in each nostril as directed 30 mL 9     CHOLECALCIFEROL, VITAMIN D3, (VITAMIN D3 ORAL) Take 1 capsule by mouth daily. 5000       ibuprofen (ADVIL,MOTRIN) 800 MG tablet Take 800 mg by mouth every 6 (six) hours as needed for pain.       loratadine (CLARITIN) 10 mg tablet Take 1 tablet (10 mg total) by mouth daily. 90 tablet 3     mometasone-formoterol (DULERA) 100-5 mcg/actuation HFAA inhaler Inhale 2 puffs 2 (two) times a day. 1 Inhaler 1     montelukast (SINGULAIR) 10 mg tablet TAKE ONE TABLET BY MOUTH ONE TIME DAILY 90 tablet 3     tiZANidine (ZANAFLEX) 4 MG tablet Take 1 tablet (4 mg total) by mouth at bedtime as needed. 30 tablet 3     cetirizine (ZYRTEC) 10 MG tablet Take 1 tablet (10 mg total) by mouth daily. 90 tablet 3     No current facility-administered medications for this visit.         Allergies   Allergen Reactions     Naproxen Other (See Comments)     Hand swelling     Penicillins Hives     Prednisone      Other: Redness and extreme irritability         Patient Active Problem List   Diagnosis     Acne Vulgaris     Overweight     Migraine Headache     Vitamin D Deficiency     Generalized Osteoarthritis Of Multiple Sites     Joint Pain, Localized In The Wrist     Fibromyalgia     Carpal Tunnel Syndrome     Allergic Rhinitis     Lump In / On The Skin     Tingling (Paresthesia)     Abdominal Pain     Asthma     Allergic Rhinitis Due To Ragweed Pollen  "    Allergic Rhinitis Due To Cats     Allergic Rhinitis Due To Mold     Postconcussion syndrome     Cervical strain     Concussion     S/P hysterectomy     Dense breast tissue on mammogram     Family history of malignant neoplasm of breast     Raynaud's syndrome     Bunion, right       Past Medical History:   Diagnosis Date     Abnormal Pap smear of cervix     hysterectomy, needs vaginal pap every 5 yrs     Ovarian cyst     Right       Past Surgical History:   Procedure Laterality Date     CYSTECTOMY Right     several     HYSTERECTOMY  2005     LAPAROSCOPIC VAGINAL HYSTERECTOMY  3/2005    for cervical cancer, needs vaginal pap every 5 yrs     TONSILLECTOMY  1992     TUBAL LIGATION  2002       Social History     Socioeconomic History     Marital status:      Spouse name: Not on file     Number of children: Not on file     Years of education: Not on file     Highest education level: Not on file   Social Needs     Financial resource strain: Not on file     Food insecurity - worry: Not on file     Food insecurity - inability: Not on file     Transportation needs - medical: Not on file     Transportation needs - non-medical: Not on file   Occupational History     Not on file   Tobacco Use     Smoking status: Never Smoker     Smokeless tobacco: Never Used   Substance and Sexual Activity     Alcohol use: Yes     Comment: rarely     Drug use: No     Sexual activity: Yes     Partners: Male     Birth control/protection: None     Comment: hysterectomy   Other Topics Concern     Not on file   Social History Narrative     Not on file       Patient Care Team:  Lori De La Cruz MD as PCP - General (Family Medicine)          Objective:     Vitals:    01/09/19 0813   BP: 90/60   Pulse: 93   Resp: 20   Temp: 98.3  F (36.8  C)   TempSrc: Oral   SpO2: 97%   Weight: 212 lb 9 oz (96.4 kg)   Height: 5' 4.5\" (1.638 m)         Physical Exam:  EXAM:  BP 90/60 (Patient Site: Left Arm, Patient Position: Sitting, Cuff Size: " "Adult Large)   Pulse 93   Temp 98.3  F (36.8  C) (Oral)   Resp 20   Ht 5' 4.5\" (1.638 m)   Wt 212 lb 9 oz (96.4 kg)   SpO2 97%   Breastfeeding? No   BMI 35.92 kg/m     Gen:  NAD, appears well, well-hydrated  HEENT:  TMs nl, oropharynx benign, nasal mucosa nl, conjunctiva clear  Neck:  Supple, no adenopathy, no thyromegaly, no carotid bruits, no JVD  Lungs:  Clear to auscultation bilaterally  Cor:  RRR no murmur  Abd:  Soft, nontender, BS+, no masses, no guarding or rebound, no HSM  Extr:  Neg., bilateral bunions  Neuro:  No asymmetry, Nl motor tone/strength, nl sensation, reflexes =, gait nl, nl coordination, CN intact,   Skin:  Warm/dry        There are no Patient Instructions on file for this visit.        Labs:  Recent Results (from the past 24 hour(s))   Hemoglobin    Collection Time: 01/09/19  9:17 AM   Result Value Ref Range    Hemoglobin 13.9 12.0 - 16.0 g/dL       Immunization History   Administered Date(s) Administered     DT (pediatric) 01/01/1999     Hep A, historic 03/01/2007, 09/12/2007     Hep B, historic 01/01/1900, 01/01/1996, 02/01/1996, 06/01/1996     Influenza, inj, historic,unspecified 11/04/2008     Influenza, seasonal,quad inj 6-35 mos 10/19/2012     Influenza,seasonal quad, PF 10/09/2013     MMR 01/01/1900, 02/15/2001     Rubella 01/01/1900, 03/28/2001     Td,adult,historic,unspecified 01/01/1990, 07/31/2008     Tdap 03/01/2007, 09/11/2014           Electronically signed by Lori De La Cruz MD 01/09/19 8:07 AM    "

## 2021-06-26 ENCOUNTER — HEALTH MAINTENANCE LETTER (OUTPATIENT)
Age: 41
End: 2021-06-26

## 2021-07-13 ENCOUNTER — RECORDS - HEALTHEAST (OUTPATIENT)
Dept: ADMINISTRATIVE | Facility: CLINIC | Age: 41
End: 2021-07-13

## 2021-07-21 ENCOUNTER — RECORDS - HEALTHEAST (OUTPATIENT)
Dept: ADMINISTRATIVE | Facility: CLINIC | Age: 41
End: 2021-07-21

## 2021-10-11 ENCOUNTER — HEALTH MAINTENANCE LETTER (OUTPATIENT)
Age: 41
End: 2021-10-11

## 2022-03-14 DIAGNOSIS — J30.1 CHRONIC SEASONAL ALLERGIC RHINITIS DUE TO POLLEN: ICD-10-CM

## 2022-03-14 RX ORDER — MONTELUKAST SODIUM 10 MG/1
TABLET ORAL
Qty: 90 TABLET | Refills: 0 | Status: SHIPPED | OUTPATIENT
Start: 2022-03-14 | End: 2022-05-05

## 2022-05-05 ENCOUNTER — OFFICE VISIT (OUTPATIENT)
Dept: ALLERGY | Facility: CLINIC | Age: 42
End: 2022-05-05
Payer: COMMERCIAL

## 2022-05-05 VITALS — HEART RATE: 84 BPM | OXYGEN SATURATION: 100 % | WEIGHT: 204 LBS | BODY MASS INDEX: 35.02 KG/M2

## 2022-05-05 DIAGNOSIS — J30.1 CHRONIC SEASONAL ALLERGIC RHINITIS DUE TO POLLEN: Primary | ICD-10-CM

## 2022-05-05 DIAGNOSIS — J45.20 MILD INTERMITTENT ASTHMA WITHOUT COMPLICATION: ICD-10-CM

## 2022-05-05 DIAGNOSIS — T78.1XXA ADVERSE FOOD REACTION, INITIAL ENCOUNTER: ICD-10-CM

## 2022-05-05 DIAGNOSIS — L50.9 URTICARIA: ICD-10-CM

## 2022-05-05 PROCEDURE — 99214 OFFICE O/P EST MOD 30 MIN: CPT | Performed by: ALLERGY & IMMUNOLOGY

## 2022-05-05 RX ORDER — LEVALBUTEROL TARTRATE 45 UG/1
1-2 AEROSOL, METERED ORAL EVERY 4 HOURS PRN
Qty: 15 G | Refills: 1 | Status: SHIPPED | OUTPATIENT
Start: 2022-05-05

## 2022-05-05 RX ORDER — LEVOCETIRIZINE DIHYDROCHLORIDE 5 MG/1
5 TABLET, FILM COATED ORAL EVERY EVENING
Qty: 90 TABLET | Refills: 3 | Status: SHIPPED | OUTPATIENT
Start: 2022-05-05

## 2022-05-05 RX ORDER — MONTELUKAST SODIUM 10 MG/1
TABLET ORAL
Qty: 90 TABLET | Refills: 3 | Status: SHIPPED | OUTPATIENT
Start: 2022-05-05

## 2022-05-05 RX ORDER — AZELASTINE 1 MG/ML
2 SPRAY, METERED NASAL 2 TIMES DAILY
Qty: 30 ML | Refills: 3 | Status: SHIPPED | OUTPATIENT
Start: 2022-05-05

## 2022-05-05 RX ORDER — LEVOCETIRIZINE DIHYDROCHLORIDE 5 MG/1
5 TABLET, FILM COATED ORAL EVERY EVENING
COMMUNITY
End: 2022-07-18

## 2022-05-05 ASSESSMENT — ASTHMA QUESTIONNAIRES
ACT_TOTALSCORE: 18
QUESTION_2 LAST FOUR WEEKS HOW OFTEN HAVE YOU HAD SHORTNESS OF BREATH: MORE THAN ONCE A DAY
ACT_TOTALSCORE: 18
QUESTION_5 LAST FOUR WEEKS HOW WOULD YOU RATE YOUR ASTHMA CONTROL: WELL CONTROLLED
QUESTION_3 LAST FOUR WEEKS HOW OFTEN DID YOUR ASTHMA SYMPTOMS (WHEEZING, COUGHING, SHORTNESS OF BREATH, CHEST TIGHTNESS OR PAIN) WAKE YOU UP AT NIGHT OR EARLIER THAN USUAL IN THE MORNING: ONCE OR TWICE
QUESTION_4 LAST FOUR WEEKS HOW OFTEN HAVE YOU USED YOUR RESCUE INHALER OR NEBULIZER MEDICATION (SUCH AS ALBUTEROL): ONCE A WEEK OR LESS
QUESTION_1 LAST FOUR WEEKS HOW MUCH OF THE TIME DID YOUR ASTHMA KEEP YOU FROM GETTING AS MUCH DONE AT WORK, SCHOOL OR AT HOME: NONE OF THE TIME

## 2022-05-05 NOTE — PROGRESS NOTES
Subjective       HPI     Chief complaint: Allergy follow-up    History of present illness: This is a pleasant 42-year-old woman with a history of allergies and asthma here today for follow-up visit.  She reports that the montelukast still controls her symptoms and seems to do well but she has had some increased symptoms this spring.  She has noted some increased nasal congestion.  She tried Flonase previously but did not tolerate it due to the scented smell.  She has found that Xyzal seems to help the most.  She has not restarted Dulera.  She has been using her Xopenex inhaler prior to exercise.  She reports that typically she did not need to do this in years past.  She did have COVID in September.  Since that time, she has had this increased shortness of breath.  She denies any cough, wheeze or shortness of breath outside of exercise.    She does note when she eats seafood, she will develop many hours later severe abdominal pain.  She has no vomiting or diarrhea.  No hives, swelling or shortness of breath.    She notes with her second COVID-vaccine she developed hives 3 days after.  She occasionally will have hives without any specific stimulus.  She states that she was told that she should receive her booster under observation.    Review of Systems         Objective    Pulse 84   Wt 92.5 kg (204 lb)   SpO2 100%   BMI 35.02 kg/m    Body mass index is 35.02 kg/m .  Physical Exam      Gen: Pleasant female not in acute distress  HEENT: Eyes no erythema of the bulbar or palpebral conjunctiva, no edema. Ears: TMs well visualized, no effusions. Nose: No congestion, mucosa normal. Mouth: Throat clear, no lip or tongue edema.     Respiratory: Clear to auscultation bilaterally, no adventitious breath sounds    Skin: No rashes or lesions  Psych: Alert and oriented times 3    Impression report and plan:  1.  Allergic rhinitis  2.  Intermittent asthma    Continue montelukast and Xyzal.  Recommended Astelin nasal spray  2 sprays each nostril twice daily as needed.  Okay to premedicate exercise with albuterol or Xopenex.  If using greater than 2 times per week outside exercise, she should let us know.    3.  Adverse food reaction to seafood    She does not have vomiting or diarrhea but this could be food protein induced enterocolitis syndrome.  Continued avoidance of seafood.  Allergy testing is not necessary.    4.  Hives after COVID vaccination    Timeline is not considered around an allergic reaction to the COVID vaccination.  She would like to receive her booster under observation.  Staff will contact her to set this up.  She must take her Xyzal at least twice daily before and after the vaccination.  History sounds consistent with chronic urticaria.    Time spent with patient, chart review and documentation, 30 minutes on date of service.

## 2022-05-05 NOTE — LETTER
5/5/2022         RE: Alanna Pederson  [address]  [place] [address]        Dear Colleague,    Thank you for referring your patient, Alanna Pederson, to the [facility]. Please see a copy of my visit note below.          Subjective       HPI     Chief complaint: Allergy follow-up    History of present illness: This is a pleasant 42-year-old woman with a history of allergies and asthma here today for follow-up visit.  She reports that the montelukast still controls her symptoms and seems to do well but she has had some increased symptoms this spring.  She has noted some increased nasal congestion.  She tried Flonase previously but did not tolerate it due to the scented smell.  She has found that Xyzal seems to help the most.  She has not restarted Dulera.  She has been using her Xopenex inhaler prior to exercise.  She reports that typically she did not need to do this in years past.  She did have COVID in September.  Since that time, she has had this increased shortness of breath.  She denies any cough, wheeze or shortness of breath outside of exercise.    She does note when she eats seafood, she will develop many hours later severe abdominal pain.  She has no vomiting or diarrhea.  No hives, swelling or shortness of breath.    She notes with her second COVID-vaccine she developed hives 3 days after.  She occasionally will have hives without any specific stimulus.  She states that she was told that she should receive her booster under observation.    Review of Systems         Objective    Pulse 84   Wt 92.5 kg (204 lb)   SpO2 100%   BMI 35.02 kg/m    Body mass index is 35.02 kg/m .  Physical Exam      Gen: Pleasant female not in acute distress  HEENT: Eyes no erythema of the bulbar or palpebral conjunctiva, no edema. Ears: TMs well visualized, no effusions. Nose: No congestion, mucosa normal. Mouth: Throat clear, no lip or tongue edema.     Respiratory: Clear to auscultation  bilaterally, no adventitious breath sounds    Skin: No rashes or lesions  Psych: Alert and oriented times 3    Impression report and plan:  1.  Allergic rhinitis  2.  Intermittent asthma    Continue montelukast and Xyzal.  Recommended Astelin nasal spray 2 sprays each nostril twice daily as needed.  Okay to premedicate exercise with albuterol or Xopenex.  If using greater than 2 times per week outside exercise, she should let us know.    3.  Adverse food reaction to seafood    She does not have vomiting or diarrhea but this could be food protein induced enterocolitis syndrome.  Continued avoidance of seafood.  Allergy testing is not necessary.    4.  Hives after COVID vaccination    Timeline is not considered around an allergic reaction to the COVID vaccination.  She would like to receive her booster under observation.  Staff will contact her to set this up.  She must take her Xyzal at least twice daily before and after the vaccination.  History sounds consistent with chronic urticaria.    Time spent with patient, chart review and documentation, 30 minutes on date of service.          Again, thank you for allowing me to participate in the care of your patient.        Sincerely,        Shefali MCCARTY MD

## 2022-05-09 ENCOUNTER — TELEPHONE (OUTPATIENT)
Dept: ALLERGY | Facility: CLINIC | Age: 42
End: 2022-05-09
Payer: COMMERCIAL

## 2022-05-09 NOTE — TELEPHONE ENCOUNTER
Prior Authorization Retail Medication Request    Medication/Dose: XOPENEX HFA ORAL INH (200 PFS) 15GM  ICD code (if different than what is on RX):    Previously Tried and Failed:    Rationale:      Insurance Name:  [employer]ICA  Insurance ID:  [id number]      Pharmacy Information (if different than what is on RX)  Name:  [facility]  Phone:  [phone]

## 2022-05-12 NOTE — TELEPHONE ENCOUNTER
PA Initiation    Medication: XOPENEX HFA ORAL INH (200 PFS) 15GM  Insurance Company:    Ph[facility] the Rx[id number] - [place], [place] - [address] AT [facility] [address]  Filling Pharmacy Phone: [phone]  Filling Pharmacy Fax:    Start Date: 5/12/2022

## 2022-05-12 NOTE — TELEPHONE ENCOUNTER
Prior Authorization Approval    Authorization Effective Date: 4/12/2022  Authorization Expiration Date: 5/12/2023  Medication: XOPENEX HFA ORAL INH (200 PFS) 15GM-APPROVED  Approved Dose/Quantity: 15   Reference #:     Insurance Company:    Expected CoPay:       CoPay Card Available:      Foundation Assistance Needed:    Which Pharmacy is filling the prescription (Not needed for infusion/clinic administered): [id number] - [place], [place] - [address] AT [facility] [address]  Pharmacy Notified: Yes  Patient Notified: YesComment:  PHARMACY WILL NOTIFY WHEN READY

## 2022-06-21 ENCOUNTER — OFFICE VISIT (OUTPATIENT)
Dept: FAMILY MEDICINE | Facility: CLINIC | Age: 42
End: 2022-06-21
Payer: COMMERCIAL

## 2022-06-21 VITALS
HEART RATE: 78 BPM | RESPIRATION RATE: 18 BRPM | SYSTOLIC BLOOD PRESSURE: 107 MMHG | BODY MASS INDEX: 35.7 KG/M2 | WEIGHT: 208 LBS | DIASTOLIC BLOOD PRESSURE: 71 MMHG

## 2022-06-21 DIAGNOSIS — R21 RASH: ICD-10-CM

## 2022-06-21 DIAGNOSIS — L30.9 DERMATITIS: Primary | ICD-10-CM

## 2022-06-21 LAB — B BURGDOR IGG+IGM SER QL: 0.08

## 2022-06-21 PROCEDURE — 86618 LYME DISEASE ANTIBODY: CPT | Performed by: FAMILY MEDICINE

## 2022-06-21 PROCEDURE — 99213 OFFICE O/P EST LOW 20 MIN: CPT | Performed by: FAMILY MEDICINE

## 2022-06-21 PROCEDURE — 36415 COLL VENOUS BLD VENIPUNCTURE: CPT | Performed by: FAMILY MEDICINE

## 2022-06-21 RX ORDER — BETAMETHASONE DIPROPIONATE 0.5 MG/G
CREAM TOPICAL 2 TIMES DAILY
Qty: 15 G | Refills: 0 | Status: SHIPPED | OUTPATIENT
Start: 2022-06-21

## 2022-06-21 NOTE — PROGRESS NOTES
ICD-10-CM    1. Dermatitis  L30.9 betamethasone dipropionate (DIPROSONE) 0.05 % external cream   2. Rash  R21 Lyme Disease Total Abs Bld with Reflex to Confirm CLIA       Dermatitis most likely contact.  Rule out Lyme's.    She has been using 1% hydrocortisone cream 3 times a day.  She thinks this is helping but its slowly improving.    Will use betamethasone cream 0.05% twice daily until resolution.  Call if is not resolving    Should be contacted with the results of the blood test for Lyme's.          Enzo Mondragon is a 42 year old, presenting for the following health issues:  Skin Spots (Red spot on right hand, getting better)      History of Present Illness       Reason for visit:  Rash on right hand  Symptom onset:  3-4 weeks ago  Symptoms include:  Red bumpy area  Symptom intensity:  Mild  Symptom progression:  Improving  Had these symptoms before:  No  What makes it worse:  No  What makes it better:  Hydrocortisone cream    She eats 2-3 servings of fruits and vegetables daily.She consumes 1 sweetened beverage(s) daily.She exercises with enough effort to increase her heart rate 30 to 60 minutes per day.  She exercises with enough effort to increase her heart rate 5 days per week.   She is taking medications regularly.     Patient has a 1 month history of rash on the right hand gets along the base of the thumb over the first metacarpal area.  It was about 2 cm it is gotten slightly smaller less red.    Initially had a darker redness to the edge of the circular rash.  There is been no central clearing.  It has been raised minimally pruritic.  Question if she had a bite or some exposure or contact.  She had been working in the yard prior to this happening    She does have a history of asthma and sees Dr. Rodriguez, allergist.  She continues on Singulair and Xyzal.  Takes Dulera.  No significant wheezing.    She did get a reaction to the second COVID vaccination and had hives for a week.    She discussed with  her allergist that if she does get a booster she will get it at their clinic and they will have The ready and she will double up on her allergy medications.      Review of Systems   10 point review of systems positive as outlined above otherwise negative      Objective    /71 (BP Location: Left arm, Patient Position: Sitting, Cuff Size: Adult Regular)   Pulse 78   Resp 18   Wt 94.3 kg (208 lb)   BMI 35.70 kg/m    Body mass index is 35.7 kg/m .  Physical Exam     Vital signs are stable    HEENT no nasal drainage or sore throat    Lungs: Nonlabored breathing    Skin with the right hand on the dorsal aspect over the first metacarpal area she has a 1/2 cm slightly raised reddish patch.  No central clearing no raised edge on the circumference.    No drainage no warmth.    We will check a Lyme's blood test      .  ..

## 2022-06-22 ENCOUNTER — TELEPHONE (OUTPATIENT)
Dept: FAMILY MEDICINE | Facility: CLINIC | Age: 42
End: 2022-06-22

## 2022-06-22 NOTE — TELEPHONE ENCOUNTER
----- Message from Umair Bernard MD sent at 6/22/2022 10:12 AM CDT -----  Please contact this patient.  Let her know that the Lyme's test came back negative.  No evidence of Lyme's disease.

## 2022-07-12 ENCOUNTER — APPOINTMENT (OUTPATIENT)
Dept: CT IMAGING | Facility: HOSPITAL | Age: 42
End: 2022-07-12
Attending: EMERGENCY MEDICINE
Payer: COMMERCIAL

## 2022-07-12 ENCOUNTER — HOSPITAL ENCOUNTER (EMERGENCY)
Facility: HOSPITAL | Age: 42
Discharge: HOME | End: 2022-07-12
Attending: STUDENT IN AN ORGANIZED HEALTH CARE EDUCATION/TRAINING PROGRAM | Admitting: STUDENT IN AN ORGANIZED HEALTH CARE EDUCATION/TRAINING PROGRAM
Payer: COMMERCIAL

## 2022-07-12 VITALS
HEIGHT: 64 IN | WEIGHT: 210 LBS | TEMPERATURE: 99.5 F | RESPIRATION RATE: 18 BRPM | BODY MASS INDEX: 35.85 KG/M2 | DIASTOLIC BLOOD PRESSURE: 74 MMHG | HEART RATE: 78 BPM | OXYGEN SATURATION: 99 % | SYSTOLIC BLOOD PRESSURE: 121 MMHG

## 2022-07-12 DIAGNOSIS — R07.9 CHEST PAIN, UNSPECIFIED TYPE: ICD-10-CM

## 2022-07-12 LAB
ANION GAP SERPL CALCULATED.3IONS-SCNC: 8 MMOL/L (ref 5–18)
ATRIAL RATE - MUSE: 92 BPM
BASOPHILS # BLD AUTO: 0 10E3/UL (ref 0–0.2)
BASOPHILS NFR BLD AUTO: 0 %
BUN SERPL-MCNC: 17 MG/DL (ref 8–22)
CALCIUM SERPL-MCNC: 9.4 MG/DL (ref 8.5–10.5)
CHLORIDE BLD-SCNC: 103 MMOL/L (ref 98–107)
CO2 SERPL-SCNC: 25 MMOL/L (ref 22–31)
CREAT SERPL-MCNC: 0.83 MG/DL (ref 0.6–1.1)
D DIMER PPP FEU-MCNC: 0.51 UG/ML FEU (ref 0–0.5)
DIASTOLIC BLOOD PRESSURE - MUSE: NORMAL MMHG
EOSINOPHIL # BLD AUTO: 0.1 10E3/UL (ref 0–0.7)
EOSINOPHIL NFR BLD AUTO: 1 %
ERYTHROCYTE [DISTWIDTH] IN BLOOD BY AUTOMATED COUNT: 11.9 % (ref 10–15)
FLUAV RNA SPEC QL NAA+PROBE: NEGATIVE
FLUBV RNA RESP QL NAA+PROBE: NEGATIVE
GFR SERPL CREATININE-BSD FRML MDRD: 89 ML/MIN/1.73M2
GLUCOSE BLD-MCNC: 104 MG/DL (ref 70–125)
HCT VFR BLD AUTO: 41.8 % (ref 35–47)
HGB BLD-MCNC: 13.9 G/DL (ref 11.7–15.7)
HOLD SPECIMEN: NORMAL
IMM GRANULOCYTES # BLD: 0 10E3/UL
IMM GRANULOCYTES NFR BLD: 1 %
INTERPRETATION ECG - MUSE: NORMAL
LYMPHOCYTES # BLD AUTO: 2 10E3/UL (ref 0.8–5.3)
LYMPHOCYTES NFR BLD AUTO: 28 %
MCH RBC QN AUTO: 31.7 PG (ref 26.5–33)
MCHC RBC AUTO-ENTMCNC: 33.3 G/DL (ref 31.5–36.5)
MCV RBC AUTO: 95 FL (ref 78–100)
MONOCYTES # BLD AUTO: 0.5 10E3/UL (ref 0–1.3)
MONOCYTES NFR BLD AUTO: 7 %
NEUTROPHILS # BLD AUTO: 4.6 10E3/UL (ref 1.6–8.3)
NEUTROPHILS NFR BLD AUTO: 63 %
NRBC # BLD AUTO: 0 10E3/UL
NRBC BLD AUTO-RTO: 0 /100
P AXIS - MUSE: 47 DEGREES
PLATELET # BLD AUTO: 325 10E3/UL (ref 150–450)
POTASSIUM BLD-SCNC: 3.8 MMOL/L (ref 3.5–5)
PR INTERVAL - MUSE: 132 MS
QRS DURATION - MUSE: 80 MS
QT - MUSE: 338 MS
QTC - MUSE: 417 MS
R AXIS - MUSE: 45 DEGREES
RBC # BLD AUTO: 4.39 10E6/UL (ref 3.8–5.2)
SARS-COV-2 RNA RESP QL NAA+PROBE: NEGATIVE
SODIUM SERPL-SCNC: 136 MMOL/L (ref 136–145)
SYSTOLIC BLOOD PRESSURE - MUSE: NORMAL MMHG
T AXIS - MUSE: 51 DEGREES
TROPONIN I SERPL-MCNC: <0.01 NG/ML (ref 0–0.29)
VENTRICULAR RATE- MUSE: 92 BPM
WBC # BLD AUTO: 7.2 10E3/UL (ref 4–11)

## 2022-07-12 PROCEDURE — 36415 COLL VENOUS BLD VENIPUNCTURE: CPT | Performed by: EMERGENCY MEDICINE

## 2022-07-12 PROCEDURE — 250N000013 HC RX MED GY IP 250 OP 250 PS 637: Performed by: STUDENT IN AN ORGANIZED HEALTH CARE EDUCATION/TRAINING PROGRAM

## 2022-07-12 PROCEDURE — 250N000009 HC RX 250: Performed by: STUDENT IN AN ORGANIZED HEALTH CARE EDUCATION/TRAINING PROGRAM

## 2022-07-12 PROCEDURE — 80048 BASIC METABOLIC PNL TOTAL CA: CPT | Performed by: EMERGENCY MEDICINE

## 2022-07-12 PROCEDURE — 84484 ASSAY OF TROPONIN QUANT: CPT | Performed by: EMERGENCY MEDICINE

## 2022-07-12 PROCEDURE — 99285 EMERGENCY DEPT VISIT HI MDM: CPT | Mod: CS,25

## 2022-07-12 PROCEDURE — 87636 SARSCOV2 & INF A&B AMP PRB: CPT | Performed by: EMERGENCY MEDICINE

## 2022-07-12 PROCEDURE — 85025 COMPLETE CBC W/AUTO DIFF WBC: CPT | Performed by: EMERGENCY MEDICINE

## 2022-07-12 PROCEDURE — 250N000013 HC RX MED GY IP 250 OP 250 PS 637: Performed by: EMERGENCY MEDICINE

## 2022-07-12 PROCEDURE — 71275 CT ANGIOGRAPHY CHEST: CPT

## 2022-07-12 PROCEDURE — 85379 FIBRIN DEGRADATION QUANT: CPT | Performed by: EMERGENCY MEDICINE

## 2022-07-12 PROCEDURE — 93005 ELECTROCARDIOGRAM TRACING: CPT | Performed by: EMERGENCY MEDICINE

## 2022-07-12 PROCEDURE — 250N000011 HC RX IP 250 OP 636: Performed by: STUDENT IN AN ORGANIZED HEALTH CARE EDUCATION/TRAINING PROGRAM

## 2022-07-12 PROCEDURE — C9803 HOPD COVID-19 SPEC COLLECT: HCPCS

## 2022-07-12 RX ORDER — IOPAMIDOL 755 MG/ML
75 INJECTION, SOLUTION INTRAVASCULAR ONCE
Status: COMPLETED | OUTPATIENT
Start: 2022-07-12 | End: 2022-07-12

## 2022-07-12 RX ORDER — ASPIRIN 81 MG/1
324 TABLET, CHEWABLE ORAL ONCE
Status: COMPLETED | OUTPATIENT
Start: 2022-07-12 | End: 2022-07-12

## 2022-07-12 RX ADMIN — LIDOCAINE HYDROCHLORIDE 30 ML: 20 SOLUTION ORAL; TOPICAL at 15:16

## 2022-07-12 RX ADMIN — ASPIRIN 81 MG CHEWABLE TABLET 324 MG: 81 TABLET CHEWABLE at 12:55

## 2022-07-12 RX ADMIN — IOPAMIDOL 75 ML: 755 INJECTION, SOLUTION INTRAVENOUS at 15:54

## 2022-07-12 NOTE — ED PROVIDER NOTES
Emergency Department Encounter         FINAL IMPRESSION:  Chest pain        ED COURSE AND MEDICAL DECISION MAKING       ED Course as of 07/12/22 1624   Tue Jul 12, 2022   1510 Patient is a 42-year-old obese female with a strong family history of cardiac disease, no other chronic medical problems, here from home with chest pain that began last night around 6:00.  States he was watching TV when it happened.  Recently returned from [place].  States that he was nauseous and diaphoretic and lasted for approximately half an hour.  He went away on its own.  No radiation to the back or abdomen.  No syncope.  No palpitations.  States that she did a lot of walking in [place] and had no exertional dyspnea or chest discomfort or any other symptoms of suggest ACS.  On arrival here she has 2 out of 10 discomfort in her anterior chest and she draws a line down the middle of her sternum.  No abdominal pain.  No neck pain.  No paresthesias.  Physical examination overall is unremarkable.  No reproducible pain.  Heart and lungs are normal.  No leg swelling.  D-dimer mildly elevated.  Plan for CTA and most likely discharge home with close outpatient follow-up.  Troponin is negative out of triage   1511 EKG is sinus rate 92, no signs of acute ischemia, no inversions or depressions .   -Labs reassuring.  CT scan normal.  Attempted GI cocktail with no significant relief.  Patient still complaining of a mild burning sensation in epigastrium.  Her history is suggestive of a GI cause such as reflux or gastritis or esophagitis however because of her family history we will have patient get seen as an outpatient for cardiology work-up.  -Heart score 1 for family history and obesity.            3:05 PM I met with the patient, obtained history, performed an initial exam, and discussed options and plan for diagnostics and treatment here in the ED.      At the conclusion of the encounter I discussed the results of all the tests and the  disposition. The questions were answered. The patient or family acknowledged understanding and was agreeable with the care plan.    MEDICATIONS GIVEN IN THE EMERGENCY DEPARTMENT:  Medications   aspirin (ASA) chewable tablet 324 mg (324 mg Oral Given 7/12/22 1255)   lidocaine (viscous) (XYLOCAINE) 2 % 15 mL, alum & mag hydroxide-simethicone (MAALOX) 15 mL GI Cocktail (30 mLs Oral Given 7/12/22 3306)   iopamidol (ISOVUE-370) solution 75 mL (75 mLs Intravenous Given 7/12/22 6974)       NEW PRESCRIPTIONS STARTED AT TODAY'S ED VISIT:  New Prescriptions    No medications on file       HPI     Patient information obtained from: Patient    Use of Interpretor: N/A  Alanna Pederson is a 42 year old female with a pertinent history of s/p hysterectomy, sports induced asthma and heart palpitations who presents to this ED for evaluation of chest pain.    Starting last night (07/11) the patient reports that she has developed an achy pressure in her chest that is not sharp and localized in the center of her chest. She had recently came back from a trip to [place] last week and had been feeling fine prior to last night. She first noticed her symptoms while lying down watching TV yesterday and she tried walking around but it did not help.     She also developed heart burn, nausea (has since resolved) as well as diaphoresis (also since resolved). She denies any leg swelling, abdominal pain, bowel changes, urinary changes, or fever.    Patient reports a personal history of sports induced asthma and sees a doctor regularly. Also reports a family history of heart attacks and heart problems. No other complaints at this time.        REVIEW OF SYSTEMS:  Review of Systems   Constitutional: Negative for fever, malaise. Positive for diaphoresis (resolved).  HEENT: Negative runny nose, sore throat, ear pain, neck pain  Respiratory: Negative for shortness of breath, cough, congestion  Cardiovascular: Negative for leg edema. Positive for chest  "pain.  Gastrointestinal: Negative for abdominal distention, abdominal pain, constipation, vomiting, diarrhea. Positive for nausea (resolved). Positive for heart burn.  Genitourinary: Negative for dysuria and hematuria.   Integument: Negative for rash, skin breakdown  Neurological: Negative for paresthesias, weakness, headache.  Musculoskeletal: Negative for joint pain, joint swelling      All other systems reviewed and are negative.          MEDICAL HISTORY     Past Medical History:   Diagnosis Date     Abnormal Pap smear of cervix      Concussion 12/21/2014     Other acne      Ovarian cyst        Past Surgical History:   Procedure Laterality Date     CYSTECTOMY Right     several     HYSTERECTOMY  2005     LAPAROSCOPIC ASSISTED HYSTERECTOMY VAGINAL  3/2005    for cervical cancer, needs vaginal pap every 5 yrs     TONSILLECTOMY  1992     TUBAL LIGATION  2002       Social History     Tobacco Use     Smoking status: Never Smoker     Smokeless tobacco: Never Used     Tobacco comment: boyfriend smokes   Substance Use Topics     Alcohol use: Yes     Comment: Alcoholic Drinks/day: rarely     Drug use: No       azelastine (ASTELIN) 0.1 % nasal spray  betamethasone dipropionate (DIPROSONE) 0.05 % external cream  CHOLECALCIFEROL, VITAMIN D3, (VITAMIN D3 ORAL)  ibuprofen (ADVIL,MOTRIN) 800 MG tablet  levalbuterol (XOPENEX HFA) 45 MCG/ACT inhaler  levocetirizine (XYZAL) 5 MG tablet  levocetirizine (XYZAL) 5 MG tablet  loratadine (CLARITIN) 10 mg tablet  mometasone-formoterol (DULERA) 200-5 mcg/actuation HFAA inhaler  montelukast (SINGULAIR) 10 MG tablet  tiZANidine (ZANAFLEX) 4 MG tablet            PHYSICAL EXAM     /68   Pulse 78   Temp 99.5  F (37.5  C)   Resp 22   Ht 1.626 m (5' 4\")   Wt 95.3 kg (210 lb)   SpO2 100%   BMI 36.05 kg/m        PHYSICAL EXAM:     General: Patient appears well, nontoxic, comfortable  HEENT: Moist mucous membranes, no tongue swelling.  No head trauma.  No midline neck " pain.  Cardiovascular: Normal rate, normal rhythm, no extremity edema.  No appreciable murmur.  Respiratory: No signs of respiratory distress, lungs are clear to auscultation bilaterally with no wheezes rhonchi or rales.  Abdominal: Soft, nontender, nondistended, no palpable masses, no guarding, no rebound  Musculoskeletal: Full range of motion of joints, no deformities appreciated.  Neurological: Alert and oriented, grossly neurologically intact.  Psychological: Normal affect and mood.  Integument: No rashes appreciated          RESULTS       Labs Ordered and Resulted from Time of ED Arrival to Time of ED Departure   D DIMER QUANTITATIVE - Abnormal       Result Value    D-Dimer Quantitative 0.51 (*)    TROPONIN I - Normal    Troponin I <0.01     BASIC METABOLIC PANEL - Normal    Sodium 136      Potassium 3.8      Chloride 103      Carbon Dioxide (CO2) 25      Anion Gap 8      Urea Nitrogen 17      Creatinine 0.83      Calcium 9.4      Glucose 104      GFR Estimate 89     INFLUENZA A/B & SARS-COV2 PCR MULTIPLEX - Normal    Influenza A PCR Negative      Influenza B PCR Negative      SARS CoV2 PCR Negative     CBC WITH PLATELETS AND DIFFERENTIAL    WBC Count 7.2      RBC Count 4.39      Hemoglobin 13.9      Hematocrit 41.8      MCV 95      MCH 31.7      MCHC 33.3      RDW 11.9      Platelet Count 325      % Neutrophils 63      % Lymphocytes 28      % Monocytes 7      % Eosinophils 1      % Basophils 0      % Immature Granulocytes 1      NRBCs per 100 WBC 0      Absolute Neutrophils 4.6      Absolute Lymphocytes 2.0      Absolute Monocytes 0.5      Absolute Eosinophils 0.1      Absolute Basophils 0.0      Absolute Immature Granulocytes 0.0      Absolute NRBCs 0.0         CT Chest Pulmonary Embolism w Contrast   Final Result   IMPRESSION:   1.  No PE.                         PROCEDURES:  Procedures:  Procedures       I, Jeana Douglass  am serving as a scribe to document services personally performed by Matt Baumann DO,  based on my observations and the provider's statements to me.  I, Matt Baumann DO, attest that Jeana Douglass is acting in a scribe capacity, has observed my performance of the services and has documented them in accordance with my direction.    Matt Baumann DO  Emergency Medicine  [facility] EMERGENCY DEPARTMENT       Matt Baumann DO  07/12/22 1818

## 2022-07-12 NOTE — ED NOTES
"ED Provider In Triage Note  [facility]  Encounter Date: Jul 12, 2022    Chief Complaint   Patient presents with     Chest Pain       Brief HPI:   Alanna Pederson is a 42 year old female presenting to the Emergency Department with a chief complaint of chest pressure anterior chest since last night. FH of CAD. Does not smoke. Did recently travel from [place]. No known covid exposure. No DM or HTH.     Brief Physical Exam:  BP (!) 143/86   Pulse 98   Temp 99.5  F (37.5  C)   Resp 18   Ht 1.626 m (5' 4\")   Wt 95.3 kg (210 lb)   SpO2 98%   BMI 36.05 kg/m    General: Non-toxic appearing  HEENT: Atraumatic  Resp: No respiratory distress. Bilat breath sounds  Heart: RRR  Abdomen: Non-peritoneal  Neuro: Alert, oriented, answers questions appropriately  Psych: Behavior appropriate      Plan Initiated in Triage:  Orders Placed This Encounter     Troponin I (now)     Basic metabolic panel     D dimer quantitative     Symptomatic; Unknown Influenza A/B & SARS-CoV2 (COVID-19) Virus PCR Multiplex     aspirin (ASA) chewable tablet 324 mg       PIT Dispo:   Return to [facility] while awaiting workup and ED bed availability    Jt Macdonald MD on 7/12/2022 at 12:12 PM    Patient was evaluated by the Physician in Triage due to a limitation of available rooms in the Emergency Department. A plan of care was discussed based on the information obtained on the initial evaluation and patient was consuled to return back to the Emergency Department lobby after this initial evalutaiton until results were obtained or a room became available in the Emergency Department. Patient was counseled not to leave prior to receiving the results of their workup.     Jt Macdonald MD  [facility] EMERGENCY DEPARTMENT  [address] [place] [id number]  [phone]     Jt Macdonald MD  07/12/22 1214    "

## 2022-07-17 ENCOUNTER — HEALTH MAINTENANCE LETTER (OUTPATIENT)
Age: 42
End: 2022-07-17

## 2022-07-18 ENCOUNTER — OFFICE VISIT (OUTPATIENT)
Dept: CARDIOLOGY | Facility: CLINIC | Age: 42
End: 2022-07-18
Payer: COMMERCIAL

## 2022-07-18 VITALS
RESPIRATION RATE: 16 BRPM | DIASTOLIC BLOOD PRESSURE: 72 MMHG | WEIGHT: 207 LBS | HEART RATE: 67 BPM | SYSTOLIC BLOOD PRESSURE: 110 MMHG | BODY MASS INDEX: 35.53 KG/M2

## 2022-07-18 DIAGNOSIS — Z82.49 FAMILY HISTORY OF PREMATURE CORONARY ARTERY DISEASE: ICD-10-CM

## 2022-07-18 DIAGNOSIS — R07.89 ATYPICAL CHEST PAIN: Primary | ICD-10-CM

## 2022-07-18 DIAGNOSIS — E78.00 ELEVATED LDL CHOLESTEROL LEVEL: ICD-10-CM

## 2022-07-18 PROCEDURE — 99204 OFFICE O/P NEW MOD 45 MIN: CPT | Performed by: INTERNAL MEDICINE

## 2022-07-18 NOTE — PROGRESS NOTES
HEART CARE ENCOUNTER CONSULTATON NOTE      [facility]  [phone]      Assessment/Recommendations   Assessment:   1. Atypical Chest Pain (new)  2.  Family history of premature coronary artery disease, father had coronary disease in his 20s and  in his 40s from complications from coronary disease  3.  Elevated LDL at 142.  Baseline current risk would not initiate statin but needs to be followed  4. BMI: 35     Plan:   1.  Treadmill ECG stress test  2.  Advised patient to monitor cholesterol carefully.  Change dietary intake.  3.  Given patient's father had coronary disease in his 20s it is important to continue to screen family members for hypercholesterolemia.  Discussed this in detail with the patient       History of Present Illness/Subjective    HPI: Alanna Pederson is a 42 year old female no prior cardiac history who presents to cardiology clinic in consultation for chest pain symptoms.    Patient was seen in the emergency department on 2022.  She presented to the emergency department for episode of chest pain that began the day before.  She states that after.  After eating she had noticed severe sharp pain in her epigastrium to mid chest.  This lasted approximately 30 minutes.  Pain resolved gradually but had residual chest pressure.  Given the symptoms persisted through the night she presented to the emergency department next morning.  On arrival to emergency department her twelve-lead EKG was personally reviewed demonstrated sinus rhythm with no significant ST segment abnormalities with borderline abnormalities in the lateral leads.  Her troponin level is normal.    Since being in the emergency department she has had some very minimal chest pressure.  She has not noted any change in symptoms or increase in symptoms with exercise including walking her dog daily.  She has not noticed any change in her exertional tolerance.  She is not had any syncopal episodes.  No orthopnea  or PND symptoms.  She does occasionally have mild lower extremity edema particularly after walking.    No prior cardiac surgeries.      CT Chest: 7/12/22  FINDINGS:  ANGIOGRAM CHEST: Pulmonary arteries are normal caliber and negative for pulmonary emboli. Thoracic aorta is negative for dissection.   LUNGS AND PLEURA: Normal.  MEDIASTINUM/AXILLAE: Normal.  CORONARY ARTERY CALCIFICATION: None.  UPPER ABDOMEN: Normal.  MUSCULOSKELETAL: Mild lateral curvature of the spine.                                                                    IMPRESSION:  1.  No PE.      Physical Examination  Review of Systems   Vitals: /72 (BP Location: Right arm, Patient Position: Sitting, Cuff Size: Adult Regular)   Pulse 67   Resp 16   Wt 93.9 kg (207 lb)   BMI 35.53 kg/m    BMI= Body mass index is 35.53 kg/m .  Wt Readings from Last 3 Encounters:   07/18/22 93.9 kg (207 lb)   07/12/22 95.3 kg (210 lb)   06/21/22 94.3 kg (208 lb)         ENT/Mouth: membranes moist, no oral lesions or bleeding gums.      EYES:  no scleral icterus, normal conjunctivae       Chest/Lungs:   lungs are clear to auscultation, no rales or wheezig, no sternal scar, equal chest wall expansion    Cardiovascular:   Regular. Normal first and second heart sounds with no murmurs, rubs, or gallops; the carotid, radial and posterior tibial pulses are intact, Jugular venous pressure normal, no edema bilaterally    Abdomen:  no tenderness; bowel sounds are present   Extremities: no cyanosis or clubbing   Skin: no xanthelasma, warm.    Neurologic: normal  bilateral, no tremors     Psychiatric: alert and oriented x3, calm        Please refer above for cardiac ROS details.        Medical History  Surgical History Family History Social History   Past Medical History:   Diagnosis Date     Abnormal Pap smear of cervix     hysterectomy, needs vaginal pap every 5 yrs     Concussion 12/21/2014     Other acne     Created by Conversion      Ovarian cyst     Right      Past Surgical History:   Procedure Laterality Date     CYSTECTOMY Right     several     HYSTERECTOMY  2005     LAPAROSCOPIC ASSISTED HYSTERECTOMY VAGINAL  3/2005    for cervical cancer, needs vaginal pap every 5 yrs     TONSILLECTOMY  1992     TUBAL LIGATION  2002     Family History   Problem Relation Age of Onset     Breast Cancer Paternal Aunt 30.00     Heart Disease Paternal Aunt      Breast Cancer Paternal Grandmother      Heart Disease Paternal Grandmother      Diabetes Paternal Grandmother      Cerebrovascular Disease Paternal Grandmother      Cervical Cancer Mother      Hashimoto's thyroiditis Mother      Coronary Artery Disease Father 40.00     Coronary Artery Disease Paternal Grandfather      Diabetes Maternal Grandmother      Cerebrovascular Disease Maternal Grandmother      Bipolar Disorder Daughter      Attention Deficit Disorder Daughter      Uterine Cancer Paternal Aunt         Social History     Socioeconomic History     Marital status:      Spouse name: Not on file     Number of children: Not on file     Years of education: Not on file     Highest education level: Not on file   Occupational History     Not on file   Tobacco Use     Smoking status: Never Smoker     Smokeless tobacco: Never Used     Tobacco comment: boyfriend smokes   Substance and Sexual Activity     Alcohol use: Yes     Comment: Alcoholic Drinks/day: rarely     Drug use: No     Sexual activity: Yes     Partners: Male     Birth control/protection: None     Comment: hysterectomy   Other Topics Concern     Not on file   Social History Narrative     Not on file     Social Determinants of Health     Financial Resource Strain: Not on file   Food Insecurity: Not on file   Transportation Needs: Not on file   Physical Activity: Not on file   Stress: Not on file   Social Connections: Not on file   Intimate Partner Violence: Not on file   Housing Stability: Not on file           Medications  Allergies   Current Outpatient Medications    Medication Sig Dispense Refill     azelastine (ASTELIN) 0.1 % nasal spray Spray 2 sprays into both nostrils 2 times daily 30 mL 3     CHOLECALCIFEROL, VITAMIN D3, (VITAMIN D3 ORAL) [CHOLECALCIFEROL, VITAMIN D3, (VITAMIN D3 ORAL)] Take 1 capsule by mouth daily. 5000       ibuprofen (ADVIL,MOTRIN) 800 MG tablet [IBUPROFEN (ADVIL,MOTRIN) 800 MG TABLET] Take 800 mg by mouth every 6 (six) hours as needed for pain.       levalbuterol (XOPENEX HFA) 45 MCG/ACT inhaler Inhale 1-2 puffs into the lungs every 4 hours as needed 15 g 1     levocetirizine (XYZAL) 5 MG tablet Take 1 tablet (5 mg) by mouth every evening 90 tablet 3     mometasone-formoterol (DULERA) 200-5 mcg/actuation HFAA inhaler [MOMETASONE-FORMOTEROL (DULERA) 200-5 MCG/ACTUATION HFAA INHALER] Inhale 2 puffs 2 (two) times a day. 1 Inhaler 0     montelukast (SINGULAIR) 10 MG tablet [MONTELUKAST (SINGULAIR) 10 MG TABLET] TAKE ONE TABLET BY MOUTH ONE TIME DAILY, NO FURTHER REFILLS WITHOUT APPOINTMENT 90 tablet 3     betamethasone dipropionate (DIPROSONE) 0.05 % external cream Apply topically 2 times daily (Patient not taking: Reported on 7/18/2022) 15 g 0     tiZANidine (ZANAFLEX) 4 MG tablet [TIZANIDINE (ZANAFLEX) 4 MG TABLET] TAKE 1 TABLET(4 MG) BY MOUTH AT BEDTIME AS NEEDED (Patient not taking: Reported on 7/18/2022) 30 tablet 0       Allergies   Allergen Reactions     Naproxen Other (See Comments)     Hand swelling     Other Drug Allergy (See Comments) Unknown     Kristina     Penicillins Hives     Prednisone Unknown     Other: Redness and extreme irritability            Lab Results    Chemistry/lipid CBC Cardiac Enzymes/BNP/TSH/INR   Recent Labs   Lab Test 11/08/19  0821   CHOL 203*   HDL 45*   *   TRIG 78     Recent Labs   Lab Test 11/08/19  0821 09/25/18  0905 09/21/15  0828   * 135* 106     Recent Labs   Lab Test 07/12/22  1248      POTASSIUM 3.8   CHLORIDE 103   CO2 25      BUN 17   CR 0.83   GFRESTIMATED 89   RENO 9.4     Recent  Labs   Lab Test 07/12/22  1248 11/08/19  0821 09/25/18  0905   CR 0.83 0.70 0.67     No results for input(s): A1C in the last 98284 hours.       Recent Labs   Lab Test 07/12/22  1248   WBC 7.2   HGB 13.9   HCT 41.8   MCV 95        Recent Labs   Lab Test 07/12/22  1248 03/06/20  1343 12/09/19  1001   HGB 13.9 13.4 13.2    Recent Labs   Lab Test 07/12/22  1248   TROPONINI <0.01     No results for input(s): BNP, NTBNPI, NTBNP in the last 71830 hours.  Recent Labs   Lab Test 11/08/19  0821   TSH 0.91     No results for input(s): INR in the last 84959 hours.     Johny Maya DO    Today's clinic visit entailed:  Review of prior external note(s) from - CareEverywhere information from ED [facility], Dr. Macdonald reviewed  Review of the result(s) of each unique test - CT CHest  The following tests were independently interpreted by me as noted in my documentation: ECG  Ordering of each unique test  The level of medical decision making during this visit was of moderate complexity.

## 2022-07-18 NOTE — LETTER
2022    CINTHYA SHAFER MD  [address] [facility] [address]    RE: Alanna Pederson       Dear Colleague,     I had the pleasure of seeing Alanna Pederson in the [facility].    HEART CARE ENCOUNTER CONSULTATON NOTE      RACHEL [facility]  [phone]      Assessment/Recommendations   Assessment:   1. Atypical Chest Pain (new)  2.  Family history of premature coronary artery disease, father had coronary disease in his 20s and  in his 40s from complications from coronary disease  3.  Elevated LDL at 142.  Baseline current risk would not initiate statin but needs to be followed  4. BMI: 35     Plan:   1.  Treadmill ECG stress test  2.  Advised patient to monitor cholesterol carefully.  Change dietary intake.  3.  Given patient's father had coronary disease in his 20s it is important to continue to screen family members for hypercholesterolemia.  Discussed this in detail with the patient       History of Present Illness/Subjective    HPI: Alanna Pederson is a 42 year old female no prior cardiac history who presents to cardiology clinic in consultation for chest pain symptoms.    Patient was seen in the emergency department on 2022.  She presented to the emergency department for episode of chest pain that began the day before.  She states that after.  After eating she had noticed severe sharp pain in her epigastrium to mid chest.  This lasted approximately 30 minutes.  Pain resolved gradually but had residual chest pressure.  Given the symptoms persisted through the night she presented to the emergency department next morning.  On arrival to emergency department her twelve-lead EKG was personally reviewed demonstrated sinus rhythm with no significant ST segment abnormalities with borderline abnormalities in the lateral leads.  Her troponin level is normal.    Since being in the emergency department she has had some very minimal chest pressure.  She has not noted  any change in symptoms or increase in symptoms with exercise including walking her dog daily.  She has not noticed any change in her exertional tolerance.  She is not had any syncopal episodes.  No orthopnea or PND symptoms.  She does occasionally have mild lower extremity edema particularly after walking.    No prior cardiac surgeries.      CT Chest: 7/12/22  FINDINGS:  ANGIOGRAM CHEST: Pulmonary arteries are normal caliber and negative for pulmonary emboli. Thoracic aorta is negative for dissection.   LUNGS AND PLEURA: Normal.  MEDIASTINUM/AXILLAE: Normal.  CORONARY ARTERY CALCIFICATION: None.  UPPER ABDOMEN: Normal.  MUSCULOSKELETAL: Mild lateral curvature of the spine.                                                                    IMPRESSION:  1.  No PE.      Physical Examination  Review of Systems   Vitals: /72 (BP Location: Right arm, Patient Position: Sitting, Cuff Size: Adult Regular)   Pulse 67   Resp 16   Wt 93.9 kg (207 lb)   BMI 35.53 kg/m    BMI= Body mass index is 35.53 kg/m .  Wt Readings from Last 3 Encounters:   07/18/22 93.9 kg (207 lb)   07/12/22 95.3 kg (210 lb)   06/21/22 94.3 kg (208 lb)         ENT/Mouth: membranes moist, no oral lesions or bleeding gums.      EYES:  no scleral icterus, normal conjunctivae       Chest/Lungs:   lungs are clear to auscultation, no rales or wheezig, no sternal scar, equal chest wall expansion    Cardiovascular:   Regular. Normal first and second heart sounds with no murmurs, rubs, or gallops; the carotid, radial and posterior tibial pulses are intact, Jugular venous pressure normal, no edema bilaterally    Abdomen:  no tenderness; bowel sounds are present   Extremities: no cyanosis or clubbing   Skin: no xanthelasma, warm.    Neurologic: normal  bilateral, no tremors     Psychiatric: alert and oriented x3, calm        Please refer above for cardiac ROS details.        Medical History  Surgical History Family History Social History   Past Medical  History:   Diagnosis Date     Abnormal Pap smear of cervix     hysterectomy, needs vaginal pap every 5 yrs     Concussion 12/21/2014     Other acne     Created by Conversion      Ovarian cyst     Right     Past Surgical History:   Procedure Laterality Date     CYSTECTOMY Right     several     HYSTERECTOMY  2005     LAPAROSCOPIC ASSISTED HYSTERECTOMY VAGINAL  3/2005    for cervical cancer, needs vaginal pap every 5 yrs     TONSILLECTOMY  1992     TUBAL LIGATION  2002     Family History   Problem Relation Age of Onset     Breast Cancer Paternal Aunt 30.00     Heart Disease Paternal Aunt      Breast Cancer Paternal Grandmother      Heart Disease Paternal Grandmother      Diabetes Paternal Grandmother      Cerebrovascular Disease Paternal Grandmother      Cervical Cancer Mother      Hashimoto's thyroiditis Mother      Coronary Artery Disease Father 40.00     Coronary Artery Disease Paternal Grandfather      Diabetes Maternal Grandmother      Cerebrovascular Disease Maternal Grandmother      Bipolar Disorder Daughter      Attention Deficit Disorder Daughter      Uterine Cancer Paternal Aunt         Social History     Socioeconomic History     Marital status:      Spouse name: Not on file     Number of children: Not on file     Years of education: Not on file     Highest education level: Not on file   Occupational History     Not on file   Tobacco Use     Smoking status: Never Smoker     Smokeless tobacco: Never Used     Tobacco comment: boyfriend smokes   Substance and Sexual Activity     Alcohol use: Yes     Comment: Alcoholic Drinks/day: rarely     Drug use: No     Sexual activity: Yes     Partners: Male     Birth control/protection: None     Comment: hysterectomy   Other Topics Concern     Not on file   Social History Narrative     Not on file     Social Determinants of Health     Financial Resource Strain: Not on file   Food Insecurity: Not on file   Transportation Needs: Not on file   Physical Activity: Not  on file   Stress: Not on file   Social Connections: Not on file   Intimate Partner Violence: Not on file   Housing Stability: Not on file           Medications  Allergies   Current Outpatient Medications   Medication Sig Dispense Refill     azelastine (ASTELIN) 0.1 % nasal spray Spray 2 sprays into both nostrils 2 times daily 30 mL 3     CHOLECALCIFEROL, VITAMIN D3, (VITAMIN D3 ORAL) [CHOLECALCIFEROL, VITAMIN D3, (VITAMIN D3 ORAL)] Take 1 capsule by mouth daily. 5000       ibuprofen (ADVIL,MOTRIN) 800 MG tablet [IBUPROFEN (ADVIL,MOTRIN) 800 MG TABLET] Take 800 mg by mouth every 6 (six) hours as needed for pain.       levalbuterol (XOPENEX HFA) 45 MCG/ACT inhaler Inhale 1-2 puffs into the lungs every 4 hours as needed 15 g 1     levocetirizine (XYZAL) 5 MG tablet Take 1 tablet (5 mg) by mouth every evening 90 tablet 3     mometasone-formoterol (DULERA) 200-5 mcg/actuation HFAA inhaler [MOMETASONE-FORMOTEROL (DULERA) 200-5 MCG/ACTUATION HFAA INHALER] Inhale 2 puffs 2 (two) times a day. 1 Inhaler 0     montelukast (SINGULAIR) 10 MG tablet [MONTELUKAST (SINGULAIR) 10 MG TABLET] TAKE ONE TABLET BY MOUTH ONE TIME DAILY, NO FURTHER REFILLS WITHOUT APPOINTMENT 90 tablet 3     betamethasone dipropionate (DIPROSONE) 0.05 % external cream Apply topically 2 times daily (Patient not taking: Reported on 7/18/2022) 15 g 0     tiZANidine (ZANAFLEX) 4 MG tablet [TIZANIDINE (ZANAFLEX) 4 MG TABLET] TAKE 1 TABLET(4 MG) BY MOUTH AT BEDTIME AS NEEDED (Patient not taking: Reported on 7/18/2022) 30 tablet 0       Allergies   Allergen Reactions     Naproxen Other (See Comments)     Hand swelling     Other Drug Allergy (See Comments) Unknown     Kristina     Penicillins Hives     Prednisone Unknown     Other: Redness and extreme irritability            Lab Results    Chemistry/lipid CBC Cardiac Enzymes/BNP/TSH/INR   Recent Labs   Lab Test 11/08/19 0821   CHOL 203*   HDL 45*   *   TRIG 78     Recent Labs   Lab Test 11/08/19 0821  09/25/18  0905 09/21/15  0828   * 135* 106     Recent Labs   Lab Test 07/12/22  1248      POTASSIUM 3.8   CHLORIDE 103   CO2 25      BUN 17   CR 0.83   GFRESTIMATED 89   RENO 9.4     Recent Labs   Lab Test 07/12/22  1248 11/08/19  0821 09/25/18  0905   CR 0.83 0.70 0.67     No results for input(s): A1C in the last 31742 hours.       Recent Labs   Lab Test 07/12/22  1248   WBC 7.2   HGB 13.9   HCT 41.8   MCV 95        Recent Labs   Lab Test 07/12/22  1248 03/06/20  1343 12/09/19  1001   HGB 13.9 13.4 13.2    Recent Labs   Lab Test 07/12/22  1248   TROPONINI <0.01     No results for input(s): BNP, NTBNPI, NTBNP in the last 91997 hours.  Recent Labs   Lab Test 11/08/19  0821   TSH 0.91     No results for input(s): INR in the last 15372 hours.     Johny Maya DO    Today's clinic visit entailed:  Review of prior external note(s) from - CareEverywhere information from ED [facility], Dr. Macdonald reviewed  Review of the result(s) of each unique test - CT CHest  The following tests were independently interpreted by me as noted in my documentation: ECG  Ordering of each unique test  The level of medical decision making during this visit was of moderate complexity.          Thank you for allowing me to participate in the care of your patient.      Sincerely,     Johny Maya DO     [facility]  cc:   Matt Baumann DO  [facility]  [facility] [place] [address]

## 2022-07-27 ENCOUNTER — HOSPITAL ENCOUNTER (OUTPATIENT)
Dept: CARDIOLOGY | Facility: CLINIC | Age: 42
Discharge: HOME | End: 2022-07-27
Attending: INTERNAL MEDICINE | Admitting: INTERNAL MEDICINE
Payer: COMMERCIAL

## 2022-07-27 DIAGNOSIS — R07.89 ATYPICAL CHEST PAIN: ICD-10-CM

## 2022-07-27 DIAGNOSIS — Z82.49 FAMILY HISTORY OF PREMATURE CORONARY ARTERY DISEASE: ICD-10-CM

## 2022-07-27 LAB
CV STRESS CURRENT BP HE: NORMAL
CV STRESS CURRENT HR HE: 100
CV STRESS CURRENT HR HE: 103
CV STRESS CURRENT HR HE: 104
CV STRESS CURRENT HR HE: 105
CV STRESS CURRENT HR HE: 107
CV STRESS CURRENT HR HE: 112
CV STRESS CURRENT HR HE: 114
CV STRESS CURRENT HR HE: 114
CV STRESS CURRENT HR HE: 116
CV STRESS CURRENT HR HE: 116
CV STRESS CURRENT HR HE: 119
CV STRESS CURRENT HR HE: 123
CV STRESS CURRENT HR HE: 126
CV STRESS CURRENT HR HE: 129
CV STRESS CURRENT HR HE: 132
CV STRESS CURRENT HR HE: 136
CV STRESS CURRENT HR HE: 136
CV STRESS CURRENT HR HE: 138
CV STRESS CURRENT HR HE: 148
CV STRESS CURRENT HR HE: 151
CV STRESS CURRENT HR HE: 153
CV STRESS CURRENT HR HE: 154
CV STRESS CURRENT HR HE: 166
CV STRESS CURRENT HR HE: 166
CV STRESS CURRENT HR HE: 90
CV STRESS CURRENT HR HE: 97
CV STRESS DEVIATION TIME HE: NORMAL
CV STRESS ECHO PERCENT HR HE: NORMAL
CV STRESS EXERCISE STAGE HE: NORMAL
CV STRESS EXERCISE STAGE REACHED HE: NORMAL
CV STRESS FINAL RESTING BP HE: NORMAL
CV STRESS FINAL RESTING HR HE: 104
CV STRESS MAX HR HE: 167
CV STRESS MAX TREADMILL GRADE HE: 16
CV STRESS MAX TREADMILL SPEED HE: 4.2
CV STRESS PEAK DIA BP HE: NORMAL
CV STRESS PEAK SYS BP HE: NORMAL
CV STRESS PHASE HE: NORMAL
CV STRESS PROTOCOL HE: NORMAL
CV STRESS REASON STOPPED HE: NORMAL
CV STRESS RESTING PT POSITION HE: NORMAL
CV STRESS RESTING PT POSITION HE: NORMAL
CV STRESS ST DEVIATION AMOUNT HE: NORMAL
CV STRESS ST DEVIATION ELEVATION HE: NORMAL
CV STRESS ST EVELATION AMOUNT HE: NORMAL
CV STRESS SYMPTOMS HE: NORMAL
CV STRESS TEST TYPE HE: NORMAL
CV STRESS TOTAL STAGE TIME MIN 1 HE: NORMAL
STRESS ECHO BASELINE DIASTOLIC HE: 78
STRESS ECHO BASELINE HR: 87
STRESS ECHO BASELINE SYSTOLIC BP: 116
STRESS ECHO LAST STRESS DIASTOLIC BP: 68
STRESS ECHO LAST STRESS HR: 166
STRESS ECHO LAST STRESS SYSTOLIC BP: 154
STRESS ECHO POST ESTIMATED WORKLOAD: 11.7
STRESS ECHO POST EXERCISE DUR MIN: 10
STRESS ECHO POST EXERCISE DUR SEC: 0
STRESS ECHO TARGET HR: 150

## 2022-07-27 PROCEDURE — 93017 CV STRESS TEST TRACING ONLY: CPT

## 2022-07-27 PROCEDURE — 93016 CV STRESS TEST SUPVJ ONLY: CPT | Performed by: INTERNAL MEDICINE

## 2022-07-27 PROCEDURE — 93018 CV STRESS TEST I&R ONLY: CPT | Performed by: INTERNAL MEDICINE

## 2022-07-29 DIAGNOSIS — Z82.49 FAMILY HISTORY OF PREMATURE CORONARY ARTERY DISEASE: ICD-10-CM

## 2022-07-29 DIAGNOSIS — E78.00 ELEVATED LDL CHOLESTEROL LEVEL: Primary | ICD-10-CM

## 2022-08-15 ENCOUNTER — HOSPITAL ENCOUNTER (OUTPATIENT)
Dept: CT IMAGING | Facility: CLINIC | Age: 42
Discharge: HOME | End: 2022-08-15
Attending: INTERNAL MEDICINE | Admitting: INTERNAL MEDICINE

## 2022-08-15 DIAGNOSIS — Z82.49 FAMILY HISTORY OF PREMATURE CORONARY ARTERY DISEASE: ICD-10-CM

## 2022-08-15 DIAGNOSIS — E78.00 ELEVATED LDL CHOLESTEROL LEVEL: ICD-10-CM

## 2022-08-15 LAB
CV CALCIUM SCORE AGATSTON LM: 0
CV CALCIUM SCORING AGATSON LAD: 0
CV CALCIUM SCORING AGATSTON CX: 0
CV CALCIUM SCORING AGATSTON RCA: 0
CV CALCIUM SCORING AGATSTON TOTAL: 0

## 2022-08-15 PROCEDURE — 75571 CT HRT W/O DYE W/CA TEST: CPT

## 2022-08-15 PROCEDURE — 75571 CT HRT W/O DYE W/CA TEST: CPT | Mod: 26 | Performed by: INTERNAL MEDICINE

## 2022-09-25 ENCOUNTER — HEALTH MAINTENANCE LETTER (OUTPATIENT)
Age: 42
End: 2022-09-25

## 2022-12-14 ENCOUNTER — HOSPITAL ENCOUNTER (OUTPATIENT)
Dept: MAMMOGRAPHY | Facility: CLINIC | Age: 42
Discharge: HOME | End: 2022-12-14
Attending: FAMILY MEDICINE | Admitting: FAMILY MEDICINE
Payer: COMMERCIAL

## 2022-12-14 DIAGNOSIS — Z12.31 VISIT FOR SCREENING MAMMOGRAM: ICD-10-CM

## 2022-12-14 PROCEDURE — 77067 SCR MAMMO BI INCL CAD: CPT

## 2022-12-26 ENCOUNTER — ANCILLARY PROCEDURE (OUTPATIENT)
Dept: MAMMOGRAPHY | Facility: CLINIC | Age: 42
End: 2022-12-26
Attending: FAMILY MEDICINE
Payer: COMMERCIAL

## 2022-12-26 DIAGNOSIS — N64.89 BREAST ASYMMETRY: ICD-10-CM

## 2022-12-26 PROCEDURE — 76642 ULTRASOUND BREAST LIMITED: CPT | Mod: RT

## 2022-12-26 PROCEDURE — 77061 BREAST TOMOSYNTHESIS UNI: CPT | Mod: RT

## 2023-08-05 ENCOUNTER — HEALTH MAINTENANCE LETTER (OUTPATIENT)
Age: 43
End: 2023-08-05

## 2024-09-28 ENCOUNTER — HEALTH MAINTENANCE LETTER (OUTPATIENT)
Age: 44
End: 2024-09-28

## 2025-03-15 ENCOUNTER — HEALTH MAINTENANCE LETTER (OUTPATIENT)
Age: 45
End: 2025-03-15